# Patient Record
Sex: MALE | Race: WHITE | URBAN - METROPOLITAN AREA
[De-identification: names, ages, dates, MRNs, and addresses within clinical notes are randomized per-mention and may not be internally consistent; named-entity substitution may affect disease eponyms.]

---

## 2017-04-12 RX ORDER — LORAZEPAM 2 MG/1
TABLET ORAL
Qty: 90 TAB | Refills: 1 | Status: SHIPPED | OUTPATIENT
Start: 2017-04-12 | End: 2017-07-10 | Stop reason: SDUPTHER

## 2017-07-10 ENCOUNTER — OFFICE VISIT (OUTPATIENT)
Dept: INTERNAL MEDICINE CLINIC | Age: 42
End: 2017-07-10

## 2017-07-10 VITALS
RESPIRATION RATE: 12 BRPM | HEIGHT: 73 IN | BODY MASS INDEX: 29.42 KG/M2 | SYSTOLIC BLOOD PRESSURE: 131 MMHG | WEIGHT: 222 LBS | DIASTOLIC BLOOD PRESSURE: 83 MMHG | TEMPERATURE: 98.1 F | HEART RATE: 67 BPM

## 2017-07-10 DIAGNOSIS — F41.8 DEPRESSION WITH ANXIETY: ICD-10-CM

## 2017-07-10 DIAGNOSIS — G25.81 RESTLESS LEGS SYNDROME (RLS): ICD-10-CM

## 2017-07-10 DIAGNOSIS — R53.83 FATIGUE, UNSPECIFIED TYPE: Primary | ICD-10-CM

## 2017-07-10 DIAGNOSIS — E29.1 MALE HYPOGONADISM: ICD-10-CM

## 2017-07-10 DIAGNOSIS — R53.1 SUBJECTIVE WEAKNESS: ICD-10-CM

## 2017-07-10 DIAGNOSIS — F51.01 PRIMARY INSOMNIA: ICD-10-CM

## 2017-07-10 RX ORDER — ROPINIROLE 1 MG/1
TABLET, FILM COATED ORAL
Qty: 30 TAB | Refills: 1 | Status: SHIPPED | OUTPATIENT
Start: 2017-07-10 | End: 2017-10-10

## 2017-07-10 RX ORDER — LORAZEPAM 2 MG/1
2 TABLET ORAL
Qty: 90 TAB | Refills: 0
Start: 2017-07-10 | End: 2017-08-31 | Stop reason: SDUPTHER

## 2017-07-10 NOTE — MR AVS SNAPSHOT
Visit Information Date & Time Provider Department Dept. Phone Encounter #  
 7/10/2017  1:45 PM Renetta Feng MD [facility] [address]  [id number] Your Appointments 9/29/2017  8:30 AM  
PHYSICAL with Girtha Severe, MD  
[facility] [address]) Appt Note: fasting CPE/ Feeling weak and tired [facility] [address] [facility] [address]  
[address] [address] Upcoming Health Maintenance Date Due Pneumococcal 19-64 Medium Risk (1 of 1 - PPSV23) 11/4/1992 DTaP/Tdap/Td series (1 - Tdap) 11/4/1994 Allergies as of 7/10/2017  Review Complete On: 7/10/2017 By: Anand Garcia Severity Noted Reaction Type Reactions Bactrim [Sulfamethoprim Ds]  02/18/2011    Other (comments) Current Immunizations  Reviewed on 7/10/2017 Name Date Influenza Vaccine 11/26/2013, 1/16/2013 Influenza Vaccine (Quad) 11/24/2015  2:21 PM  
 Influenza Vaccine (Quad) PF 11/16/2016 Influenza Vaccine Split 11/29/2011 Td 10/5/2010 Reviewed by Anand Garcia on 7/10/2017 at  1:50 PM  
You Were Diagnosed With   
  
 Codes Comments Fatigue, unspecified type    -  Primary ICD-10-CM: R53.83 ICD-9-CM: 780.79 Subjective weakness     ICD-10-CM: R53.1 ICD-9-CM: 780.79 Restless legs syndrome (RLS)     ICD-10-CM: G25.81 ICD-9-CM: 333.94 Male hypogonadism     ICD-10-CM: E29.1 ICD-9-CM: 257.2 Vitals BP Pulse Temp Resp Height(growth percentile) Weight(growth percentile) 131/83 (BP 1 Location: Left arm, BP Patient Position: Sitting) 67 98.1 °F (36.7 °C) (Oral) 12 6' 1\" (1.854 m) 222 lb (100.7 kg) BMI Smoking Status 29.29 kg/m2 Never Smoker Vitals History BMI and BSA Data Body Mass Index Body Surface Area  
 29.29 kg/m 2 2.28 m 2 Preferred Pharmacy Pharmacy Name Phone [facility] [address] [place] [address] Drive, [address] [facility] [address] [phone] Your Updated Medication List  
  
   
This list is accurate as of: 7/10/17  2:43 PM.  Always use your most recent med list.  
  
  
  
  
 clomiPHENE 50 mg tablet Commonly known as:  CLOMID Three times weekly. LORazepam 2 mg tablet Commonly known as:  ATIVAN  
TAKE ONE TABLET BY MOUTH EVERY 4 HOURS AS NEEDED FOR ANXIETY  
  
 rOPINIRole 1 mg tablet Commonly known as:  Mariza Anis Take 1-2 pill nightly as needed For restless legs  
  
 tadalafil 20 mg tablet Commonly known as:  CIALIS Take 1 Tab by mouth as needed. Prescriptions Printed Refills  
 rOPINIRole (REQUIP) 1 mg tablet 1 Sig: Take 1-2 pill nightly as needed For restless legs Class: Print We Performed the Following CBC W/O DIFF [53405 CPT(R)] CK [id number] CPT(R)] FERRITIN [35624 CPT(R)] [facility] AND LH [37157 CPT(R)] IRON PROFILE [id number] CPT(R)] METABOLIC PANEL, COMPREHENSIVE [72505 CPT(R)] TESTOSTERONE, FREE+TOTAL [[id number] Custom] TSH 3RD GENERATION [82242 CPT(R)] Introducing [facility] & HE[facility]! Anusha Loza introduces [employer] patient portal. Now you can access parts of your medical record, email your doctor's office, and request medication refills online. 1. In your internet browser, go to [email or web address] 2. Click on the First Time User? Click Here link in the Sign In box. You will see the New Member Sign Up page. 3. Enter your [employer] Access Code exactly as it appears below. You will not need to use this code after youve completed the sign-up process. If you do not sign up before the expiration date, you must request a new code. · [employer] Access Code: [email or web address] Expires: 10/8/2017  1:50 PM 
 
4. Enter the last four digits of your Social Security Number (xxxx) and Date of Birth (mm/dd/yyyy) as indicated and click Submit.  You will be taken to the next sign-up page. 5. Create a Cloud[employer] ID. This will be your [employer] login ID and cannot be changed, so think of one that is secure and easy to remember. 6. Create a [employer] password. You can change your password at any time. 7. Enter your Password Reset Question and Answer. This can be used at a later time if you forget your password. 8. Enter your e-mail address. You will receive e-mail notification when new information is available in [address] 9. Click Sign Up. You can now view and download portions of your medical record. 10. Click the Download Summary menu link to download a portable copy of your medical information. If you have questions, please visit the Frequently Asked Questions section of the [employer] website. Remember, [employer] is NOT to be used for urgent needs. For medical emergencies, dial 911. Now available from your iPhone and Android! Please provide this summary of care documentation to your next provider. Your primary care clinician is listed as Anitha Mansfield. If you have any questions after today's visit, please call [phone].

## 2017-07-10 NOTE — PROGRESS NOTES
Presents to establish care. Finding things heavier to lift for about a couple of weeks. Has RLS. History of low testosterone.

## 2017-07-12 NOTE — PROGRESS NOTES
HISTORY OF PRESENT ILLNESS    New patient to my practice, referred to me by his wife who sees another provider here. Prior medical care has been from Dr Chuy Heath. he works as a [employer]. his  past medical history was reviewed, discussed, and summarized in the list below. Patient was quite irritable due to him being seen late today. He expects his appointment time would be a true time to see the doctor. Discussed that I try my best, but I tend to run 30 min late, especially later in the 1/2 day. He said \"I appreciate the straight answer\". He was being treated by Dr. Chuy Heath for hypogonadism with clomid tiw. He ran out 3 weeks ago. Was not sure it helped. No labs done on clomid. Labs 11/20176 prior to tx showed T 263, but free T 22.7 (a little high). Hx of RLS, insomnia. Hx of anxiety and depression. He consulted Dr. Felix Mcgowan for RLS. Failed trial of mirapex. Looks like he took requip  1mg tid and it may have helped per chart, but patient is not sure. He is here since he does not want to take something three times daily and seems angry that he was given tid medication    He is prescribed lorazepam 2 mg every 4 hours prn. Last refill 7/1/17 #90 and filled #90 4/12/17. He says he takes it at night or he can not sleep. Appears to take 1-2 per day at most per records.     Took wellbutrin and zoloft  The past    Feels a little more arm fatigue over the past few weeks, but it is a little better today    Review of Systems   All other systems reviewed and are negative, except as noted in HPI      Past Medical and Surgical History  Past Medical History:   Diagnosis Date    Allergic rhinitis     Alopecia, male pattern     Asthma     mild intermittent    Depression     Eczema     GERD (gastroesophageal reflux disease)     HSV-2 (herpes simplex virus 2) infection     Insomnia     Male hypogonadism     took clomid 2016    RLS (restless legs syndrome)     saw Dr. Vicky Izquierdo 8/19/15. failed mirapex        has a past surgical history that includes colonoscopy (07/08/2013). Current Outpatient Prescriptions   Medication Sig    rOPINIRole (REQUIP) 1 mg tablet Take 1-2 pill nightly as needed For restless legs    LORazepam (ATIVAN) 2 mg tablet Take 1 Tab by mouth nightly as needed for Anxiety. Max Daily Amount: 2 mg.  clomiPHENE (CLOMID) 50 mg tablet Three times weekly.  tadalafil (CIALIS) 20 mg tablet Take 1 Tab by mouth as needed. No current facility-administered medications for this visit. reports that he has never smoked. He has never used smokeless tobacco. He reports that he drinks alcohol.    family history is not on file. He was adopted. Physical Exam   Nursing note and vitals reviewed. Blood pressure 131/83, pulse 67, temperature 98.1 °F (36.7 °C), temperature source Oral, resp. rate 12, height 6' 1\" (1.854 m), weight 222 lb (100.7 kg). Constitutional: oriented to person, place, and time. No distress. Eyes: Conjunctivae are normal.   HEENT:  No cervical lymphadenopathy. No thyroid nodules or goiter  Cardiovascular: Normal rate. Regular rhythm, no murmurs, rubs. No edema  Pulmonary/Chest: Effort normal. clear to auscultation  Abdominal: soft, non-tender, non-distended  Musculoskeletal:     Neurological: Alert and oriented to person, place. Cranial nerves grossly intact. Normal gait   Skin: No rash noted. Psychiatric: appears anxious, irritable    ASSESSMENT and Rosalba Miss was seen today for establish care. Diagnoses and all orders for this visit:    Fatigue, unspecified type- may be due to low T since not treated. Check labs  -     METABOLIC PANEL, COMPREHENSIVE  -     FERRITIN  -     IRON PROFILE  -     TSH 3RD GENERATION  -     CK  -     rOPINIRole (REQUIP) 1 mg tablet; Take 1-2 pill nightly as needed For restless legs  -     CBC W/O DIFF    Subjective weakness? Check labs. mild    Restless legs syndrome (RLS)-  Relatively severe. Will try requip only at night and check iron levels. Consider trial of Horizant which would maybe work better once per day. Ativan may be helping, but I would like to wean back. Male hypogonadism - off clomid for over 1 mo. May consider resuming or may consult endo. Irritability is a concern  -     TESTOSTERONE, FREE+TOTAL  -     FSH AND LH    Primary insomnia- has been prescribed ativan for over 2 years. tx RLS. I would like to wean back in the near future. Consider doxepin. Consider bipolar?  -     LORazepam (ATIVAN) 2 mg tablet; Take 1 Tab by mouth nightly as needed for Anxiety. Max Daily Amount: 2 mg. Depression with anxiety  -     LORazepam (ATIVAN) 2 mg tablet; Take 1 Tab by mouth nightly as needed for Anxiety. Max Daily Amount: 2 mg.         There are no Patient Instructions on file for this visit.    lab results and schedule of future lab studies reviewed with patient  reviewed medications and side effects in detail    Follow-up Disposition: Not on File

## 2017-07-18 LAB
ALBUMIN SERPL-MCNC: 4.2 G/DL (ref 3.5–5.5)
ALBUMIN/GLOB SERPL: 1.7 {RATIO} (ref 1.2–2.2)
ALP SERPL-CCNC: 72 IU/L (ref 39–117)
ALT SERPL-CCNC: 17 IU/L (ref 0–44)
AST SERPL-CCNC: 15 IU/L (ref 0–40)
BILIRUB SERPL-MCNC: <0.2 MG/DL (ref 0–1.2)
BUN SERPL-MCNC: 17 MG/DL (ref 6–24)
BUN/CREAT SERPL: 18 (ref 9–20)
CALCIUM SERPL-MCNC: 9.2 MG/DL (ref 8.7–10.2)
CHLORIDE SERPL-SCNC: 103 MMOL/L (ref 96–106)
CK SERPL-CCNC: 126 U/L (ref 24–204)
CO2 SERPL-SCNC: 22 MMOL/L (ref 18–29)
CREAT SERPL-MCNC: 0.94 MG/DL (ref 0.76–1.27)
ERYTHROCYTE [DISTWIDTH] IN BLOOD BY AUTOMATED COUNT: 14.3 % (ref 12.3–15.4)
FERRITIN SERPL-MCNC: 123 NG/ML (ref 30–400)
FSH SERPL-ACNC: 2.7 MIU/ML (ref 1.5–12.4)
GLOBULIN SER CALC-MCNC: 2.5 G/DL (ref 1.5–4.5)
GLUCOSE SERPL-MCNC: 108 MG/DL (ref 65–99)
HCT VFR BLD AUTO: 42.8 % (ref 37.5–51)
HGB BLD-MCNC: 14.3 G/DL (ref 12.6–17.7)
IRON SATN MFR SERPL: 21 % (ref 15–55)
IRON SERPL-MCNC: 73 UG/DL (ref 38–169)
LH SERPL-ACNC: 2.4 MIU/ML (ref 1.7–8.6)
MCH RBC QN AUTO: 28.5 PG (ref 26.6–33)
MCHC RBC AUTO-ENTMCNC: 33.4 G/DL (ref 31.5–35.7)
MCV RBC AUTO: 85 FL (ref 79–97)
PLATELET # BLD AUTO: 299 X10E3/UL (ref 150–379)
POTASSIUM SERPL-SCNC: 4.7 MMOL/L (ref 3.5–5.2)
PROT SERPL-MCNC: 6.7 G/DL (ref 6–8.5)
RBC # BLD AUTO: 5.01 X10E6/UL (ref 4.14–5.8)
SODIUM SERPL-SCNC: 141 MMOL/L (ref 134–144)
TESTOST FREE SERPL-MCNC: 18.9 PG/ML (ref 6.8–21.5)
TESTOST SERPL-MCNC: 349.9 NG/DL (ref 348–1197)
TIBC SERPL-MCNC: 344 UG/DL (ref 250–450)
TSH SERPL DL<=0.005 MIU/L-ACNC: 2.36 UIU/ML (ref 0.45–4.5)
UIBC SERPL-MCNC: 271 UG/DL (ref 111–343)
WBC # BLD AUTO: 6.6 X10E3/UL (ref 3.4–10.8)

## 2017-08-31 ENCOUNTER — OFFICE VISIT (OUTPATIENT)
Dept: INTERNAL MEDICINE CLINIC | Age: 42
End: 2017-08-31

## 2017-08-31 VITALS
DIASTOLIC BLOOD PRESSURE: 94 MMHG | HEIGHT: 73 IN | SYSTOLIC BLOOD PRESSURE: 129 MMHG | RESPIRATION RATE: 18 BRPM | HEART RATE: 78 BPM | BODY MASS INDEX: 29.21 KG/M2 | OXYGEN SATURATION: 97 % | WEIGHT: 220.4 LBS | TEMPERATURE: 97.8 F

## 2017-08-31 DIAGNOSIS — G25.81 RESTLESS LEGS SYNDROME (RLS): ICD-10-CM

## 2017-08-31 DIAGNOSIS — E29.1 MALE HYPOGONADISM: ICD-10-CM

## 2017-08-31 DIAGNOSIS — F41.8 DEPRESSION WITH ANXIETY: Primary | ICD-10-CM

## 2017-08-31 DIAGNOSIS — F51.01 PRIMARY INSOMNIA: ICD-10-CM

## 2017-08-31 RX ORDER — LORAZEPAM 2 MG/1
2 TABLET ORAL
Qty: 90 TAB | Refills: 1 | Status: SHIPPED | OUTPATIENT
Start: 2017-08-31 | End: 2018-03-03 | Stop reason: SDUPTHER

## 2017-08-31 RX ORDER — BUPROPION HYDROCHLORIDE 300 MG/1
300 TABLET ORAL
Qty: 90 TAB | Refills: 1 | Status: SHIPPED | OUTPATIENT
Start: 2017-08-31 | End: 2017-11-02

## 2017-08-31 RX ORDER — DICLOFENAC SODIUM 75 MG/1
75 TABLET, DELAYED RELEASE ORAL
COMMUNITY
Start: 2017-08-28 | End: 2017-10-10

## 2017-08-31 RX ORDER — CLOMIPHENE CITRATE 50 MG/1
TABLET ORAL
Qty: 40 TAB | Refills: 1 | Status: SHIPPED | OUTPATIENT
Start: 2017-08-31 | End: 2017-11-02

## 2017-08-31 NOTE — PROGRESS NOTES
HISTORY OF PRESENT ILLNESS  Ho Diaz is a 35 y.o. male. HPI  Here for depression. This is chronic. He did well with zoloft but got sexual side effects. He wants to try something. He should be happy as he has a good job, marriage and a new baby. He has male hypogonadism and needs a refill of clomid . He has had recent labs with Dr Lindy Ryan. His testosterone improved. He has RLS. He did not respond to requip or mirapex. He has seen neurology and had a sleep study. He sleeps fine with ativan and needs a refill. Past Medical History:   Diagnosis Date    Allergic rhinitis     Alopecia, male pattern     Asthma     mild intermittent    Depression with anxiety     took wellbutrin, zoloft. anger management issues    Eczema     GERD (gastroesophageal reflux disease)     HSV-2 (herpes simplex virus 2) infection     Insomnia     Male hypogonadism     took clomid 2016    RLS (restless legs syndrome)     saw Dr. Mando Lockwood 8/19/15. failed mirapex     Current Outpatient Prescriptions   Medication Sig    diclofenac EC (VOLTAREN) 75 mg EC tablet Take 75 mg by mouth.  LORazepam (ATIVAN) 2 mg tablet Take 1 Tab by mouth nightly as needed for Anxiety. Max Daily Amount: 2 mg.  clomiPHENE (CLOMID) 50 mg tablet Three times weekly.  buPROPion XL (WELLBUTRIN XL) 300 mg XL tablet Take 1 Tab by mouth every morning.  rOPINIRole (REQUIP) 1 mg tablet Take 1-2 pill nightly as needed For restless legs    tadalafil (CIALIS) 20 mg tablet Take 1 Tab by mouth as needed. No current facility-administered medications for this visit. Review of Systems   All other systems reviewed and are negative. Visit Vitals    BP (!) 129/94 (BP 1 Location: Left arm, BP Patient Position: At rest)    Pulse 78    Temp 97.8 °F (36.6 °C) (Oral)    Resp 18    Ht 6' 1\" (1.854 m)    Wt 220 lb 6.4 oz (100 kg)    SpO2 97%    BMI 29.08 kg/m2       Physical Exam   Constitutional: He appears well-developed and well-nourished. Psychiatric: He has a normal mood and affect. His behavior is normal. Judgment and thought content normal.   Nursing note and vitals reviewed. ASSESSMENT and PLAN  Diagnoses and all orders for this visit:    1. Depression with anxiety  -    Start buPROPion XL (WELLBUTRIN XL) 300 mg XL tablet; Take 1 Tab by mouth every morning. 2. Male hypogonadism  -     Restart clomiPHENE (CLOMID) 50 mg tablet; Three times weekly. 3. Restless legs syndrome (RLS)  -     REFERRAL TO NEUROLOGY    4. Primary insomnia  -     LORazepam (ATIVAN) 2 mg tablet; Take 1 Tab by mouth nightly as needed for Anxiety. Max Daily Amount: 2 mg.     Reviewed plan of care with the patient who has provided input and agrees with the goals

## 2017-08-31 NOTE — PROGRESS NOTES
1. Have you been to the ER, urgent care clinic since your last visit? Hospitalized since your last visit?no      2. Have you seen or consulted any other health care providers outside of the [address] [place] [address] since your last visit? Include any pap smears or colon screening.  No  Chief Complaint   Patient presents with    Medication Evaluation     Fasting

## 2017-08-31 NOTE — MR AVS SNAPSHOT
Visit Information Date & Time Provider Department Dept. Phone Encounter #  
 8/31/2017 10:00 AM Deena Richardson MD [facility] [phone] [id number] Follow-up Instructions Return in about 6 weeks (around 10/12/2017). Upcoming Health Maintenance Date Due Pneumococcal 19-64 Medium Risk (1 of 1 - PPSV23) 11/4/1992 DTaP/Tdap/Td series (1 - Tdap) 10/6/2010 Allergies as of 8/31/2017  Review Complete On: 8/31/2017 By: Dharmesh Cheung Severity Noted Reaction Type Reactions Bactrim [Sulfamethoprim Ds]  02/18/2011    Other (comments) Current Immunizations  Reviewed on 7/10/2017 Name Date Influenza Vaccine 11/26/2013, 1/16/2013 Influenza Vaccine (Quad) 11/24/2015  2:21 PM  
 Influenza Vaccine (Quad) PF 11/16/2016 Influenza Vaccine Split 11/29/2011 Td 10/5/2010 Not reviewed this visit You Were Diagnosed With   
  
 Codes Comments Depression with anxiety    -  Primary ICD-10-CM: F41.8 ICD-9-CM: 300.4 Male hypogonadism     ICD-10-CM: E29.1 ICD-9-CM: 257.2 Restless legs syndrome (RLS)     ICD-10-CM: G25.81 ICD-9-CM: 333.94 Primary insomnia     ICD-10-CM: F51.01 
ICD-9-CM: 307.42 Vitals BP Pulse Temp Resp Height(growth percentile) Weight(growth percentile) (!) 129/94 (BP 1 Location: Left arm, BP Patient Position: At rest) 78 97.8 °F (36.6 °C) (Oral) 18 6' 1\" (1.854 m) 220 lb 6.4 oz (100 kg) SpO2 BMI Smoking Status 97% 29.08 kg/m2 Never Smoker Vitals History BMI and BSA Data Body Mass Index Body Surface Area 29.08 kg/m 2 2.27 m 2 Preferred Pharmacy Pharmacy Name Phone [facility] [address], [address] [place] [address] Rd. [address] [phone] Your Updated Medication List  
  
   
This list is accurate as of: 8/31/17 10:46 AM.  Always use your most recent med list.  
  
  
  
  
 buPROPion  mg XL tablet Commonly known as:  Vernestine Salk Take 1 Tab by mouth every morning. clomiPHENE 50 mg tablet Commonly known as:  CLOMID Three times weekly. diclofenac EC 75 mg EC tablet Commonly known as:  VOLTAREN Take 75 mg by mouth. LORazepam 2 mg tablet Commonly known as:  ATIVAN Take 1 Tab by mouth nightly as needed for Anxiety. Max Daily Amount: 2 mg. rOPINIRole 1 mg tablet Commonly known as:  Jignesh Parlor Take 1-2 pill nightly as needed For restless legs  
  
 tadalafil 20 mg tablet Commonly known as:  CIALIS Take 1 Tab by mouth as needed. Prescriptions Printed Refills LORazepam (ATIVAN) 2 mg tablet 1 Sig: Take 1 Tab by mouth nightly as needed for Anxiety. Max Daily Amount: 2 mg. Class: Print Route: Oral  
  
Prescriptions Sent to Pharmacy Refills  
 clomiPHENE (CLOMID) 50 mg tablet 1 Sig: Three times weekly. Class: Normal  
 Pharmacy: [facility] [address] [facility] Ph #: [phone]  
 buPROPion XL (WELLBUTRIN XL) 300 mg XL tablet 1 Sig: Take 1 Tab by mouth every morning. Class: Normal  
 Pharmacy: [facility] [address], [address] [facility] Ph #: [phone] Route: Oral  
  
We Performed the Following REFERRAL TO NEUROLOGY [PGO66 Custom] Comments:  
 Please evaluate patient for RLS. Follow-up Instructions Return in about 6 weeks (around 10/12/2017). Referral Information Referral ID Referred By [facility]  
  
 [id number]   
   [address] [facility] [address] [place] [address] Phone: [phone] Fax: [phone] Visits Status Start Date End Date 1 New Request 8/31/17 8/31/18 If your referral has a status of pending review or denied, additional information will be sent to support the outcome of this decision. Introducing [facility]! Prudence Farrell introduces [employer] patient portal. Now you can access parts of your medical record, email your doctor's office, and request medication refills online. 1. In your internet browser, go to [email or web address] 2. Click on the First Time User? Click Here link in the Sign In box. You will see the New Member Sign Up page. 3. Enter your [employer] Access Code exactly as it appears below. You will not need to use this code after youve completed the sign-up process. If you do not sign up before the expiration date, you must request a new code. · [employer] Access Code: [email or web address] Expires: 10/8/2017  1:50 PM 
 
4. Enter the last four digits of your Social Security Number (xxxx) and Date of Birth (mm/dd/yyyy) as indicated and click Submit. You will be taken to the next sign-up page. 5. Create a [employer] ID. This will be your [employer] login ID and cannot be changed, so think of one that is secure and easy to remember. 6. Create a [employer] password. You can change your password at any time. 7. Enter your Password Reset Question and Answer. This can be used at a later time if you forget your password. 8. Enter your e-mail address. You will receive e-mail notification when new information is available in [address] 9. Click Sign Up. You can now view and download portions of your medical record. 10. Click the Download Summary menu link to download a portable copy of your medical information. If you have questions, please visit the Frequently Asked Questions section of the [employer] website. Remember, [employer] is NOT to be used for urgent needs. For medical emergencies, dial 911. Now available from your iPhone and Android! Please provide this summary of care documentation to your next provider. Your primary care clinician is listed as Prince Scott. If you have any questions after today's visit, please call [phone].

## 2017-09-26 ENCOUNTER — TELEPHONE (OUTPATIENT)
Dept: INTERNAL MEDICINE CLINIC | Age: 42
End: 2017-09-26

## 2017-09-26 RX ORDER — PREDNISONE 20 MG/1
20 TABLET ORAL
Qty: 20 TAB | Refills: 0 | Status: SHIPPED | OUTPATIENT
Start: 2017-09-26 | End: 2017-10-10

## 2017-09-26 RX ORDER — AZITHROMYCIN 250 MG/1
250 TABLET, FILM COATED ORAL SEE ADMIN INSTRUCTIONS
Qty: 6 TAB | Refills: 0 | Status: SHIPPED | OUTPATIENT
Start: 2017-09-26 | End: 2017-10-01

## 2017-10-10 ENCOUNTER — OFFICE VISIT (OUTPATIENT)
Dept: INTERNAL MEDICINE CLINIC | Age: 42
End: 2017-10-10

## 2017-10-10 VITALS
BODY MASS INDEX: 29.24 KG/M2 | OXYGEN SATURATION: 97 % | WEIGHT: 220.6 LBS | HEART RATE: 80 BPM | RESPIRATION RATE: 16 BRPM | SYSTOLIC BLOOD PRESSURE: 140 MMHG | TEMPERATURE: 98.2 F | DIASTOLIC BLOOD PRESSURE: 87 MMHG | HEIGHT: 73 IN

## 2017-10-10 DIAGNOSIS — Z23 ENCOUNTER FOR IMMUNIZATION: ICD-10-CM

## 2017-10-10 DIAGNOSIS — F43.9 SITUATIONAL STRESS: ICD-10-CM

## 2017-10-10 DIAGNOSIS — M79.673 PAIN OF FOOT, UNSPECIFIED LATERALITY: ICD-10-CM

## 2017-10-10 DIAGNOSIS — F32.9 REACTIVE DEPRESSION: Primary | ICD-10-CM

## 2017-10-10 RX ORDER — DICLOFENAC SODIUM 75 MG/1
75 TABLET, DELAYED RELEASE ORAL 2 TIMES DAILY
Qty: 180 TAB | Refills: 1 | Status: SHIPPED | OUTPATIENT
Start: 2017-10-10 | End: 2017-11-02
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
## 2017-10-10 NOTE — PROGRESS NOTES
1. Have you been to the ER, urgent care clinic since your last visit? Hospitalized since your last visit? No    2. Have you seen or consulted any other health care providers outside of the [address] [place] [address] since your last visit? Include any pap smears or colon screening.  No   Chief Complaint   Patient presents with    Medication Evaluation     6 weeks follow up     Fasting

## 2017-10-10 NOTE — MR AVS SNAPSHOT
Visit Information Date & Time Provider Department Dept. Phone Encounter #  
 10/10/2017 10:00 AM Crissy Alexander MD [facility] [phone] [id number] Follow-up Instructions Return in about 3 months (around 1/10/2018). Follow-up and Disposition History Upcoming Health Maintenance Date Due Pneumococcal 19-64 Medium Risk (1 of 1 - PPSV23) 11/4/1992 DTaP/Tdap/Td series (1 - Tdap) 10/6/2010 Allergies as of 10/10/2017  Review Complete On: 10/10/2017 By: Kishore Bishop Severity Noted Reaction Type Reactions Bactrim [Sulfamethoprim Ds]  02/18/2011    Other (comments) Current Immunizations  Reviewed on 7/10/2017 Name Date Influenza Vaccine 11/26/2013, 1/16/2013 Influenza Vaccine (Quad) 11/24/2015  2:21 PM  
 Influenza Vaccine (Quad) PF 11/16/2016 Influenza Vaccine Split 11/29/2011 Td 10/5/2010 Not reviewed this visit You Were Diagnosed With   
  
 Codes Comments Reactive depression    -  Primary ICD-10-CM: F32.9 ICD-9-CM: 300.4 Situational stress     ICD-10-CM: F43.9 ICD-9-CM: V62.89 Pain of foot, unspecified laterality     ICD-10-CM: Y52.980 ICD-9-CM: 729.5 Encounter for immunization     ICD-10-CM: C57 ICD-9-CM: V03.89 Vitals BP Pulse Temp Resp Height(growth percentile) Weight(growth percentile) 140/87 (BP 1 Location: Left arm, BP Patient Position: At rest) 80 98.2 °F (36.8 °C) (Oral) 16 6' 1\" (1.854 m) 220 lb 9.6 oz (100.1 kg) SpO2 BMI Smoking Status 97% 29.1 kg/m2 Never Smoker BMI and BSA Data Body Mass Index Body Surface Area  
 29.1 kg/m 2 2.27 m 2 Preferred Pharmacy Pharmacy Name Phone [facility] [address], [address] [place] [address] [address]. [address] [phone] Your Updated Medication List  
  
   
This list is accurate as of: 10/10/17 10:47 AM.  Always use your most recent med list.  
  
  
  
  
 buPROPion  mg XL tablet Commonly known as:  Ismael Christianne Take 1 Tab by mouth every morning. clomiPHENE 50 mg tablet Commonly known as:  CLOMID Three times weekly. diclofenac EC 75 mg EC tablet Commonly known as:  VOLTAREN Take 1 Tab by mouth two (2) times a day. LORazepam 2 mg tablet Commonly known as:  ATIVAN Take 1 Tab by mouth nightly as needed for Anxiety. Max Daily Amount: 2 mg. Prescriptions Sent to Pharmacy Refills  
 diclofenac EC (VOLTAREN) 75 mg EC tablet 1 Sig: Take 1 Tab by mouth two (2) times a day. Class: Normal  
 Pharmacy: [address], [address] [facility] #: [phone] Route: Oral  
  
Follow-up Instructions Return in about 3 months (around 1/10/2018). Introducing [facility] SERVICES! Kimberly Beavers introduces [employer] patient portal. Now you can access parts of your medical record, email your doctor's office, and request medication refills online. 1. In your internet browser, go to [email or web address] 2. Click on the First Time User? Click Here link in the Sign In box. You will see the New Member Sign Up page. 3. Enter your [employer] Access Code exactly as it appears below. You will not need to use this code after youve completed the sign-up process. If you do not sign up before the expiration date, you must request a new code. · [employer] Access Code: [email or web address] Expires: 1/8/2018 10:47 AM 
 
4. Enter the last four digits of your Social Security Number (xxxx) and Date of Birth (mm/dd/yyyy) as indicated and click Submit. You will be taken to the next sign-up page. 5. Create a mGeneratort ID. This will be your [employer] login ID and cannot be changed, so think of one that is secure and easy to remember. 6. Create a [employer] password. You can change your password at any time. 7. Enter your Password Reset Question and Answer.  This can be used at a later time if you forget your password. 8. Enter your e-mail address. You will receive e-mail notification when new information is available in [address] 9. Click Sign Up. You can now view and download portions of your medical record. 10. Click the Download Summary menu link to download a portable copy of your medical information. If you have questions, please visit the Frequently Asked Questions section of the [employer] website. Remember, [employer] is NOT to be used for urgent needs. For medical emergencies, dial 911. Now available from your iPhone and Android! Please provide this summary of care documentation to your next provider. Your primary care clinician is listed as [address] [place] [address] [address]. If you have any questions after today's visit, please call [phone].

## 2017-10-24 ENCOUNTER — TELEPHONE (OUTPATIENT)
Dept: INTERNAL MEDICINE CLINIC | Age: 42
End: 2017-10-24

## 2017-10-24 RX ORDER — SERTRALINE HYDROCHLORIDE 50 MG/1
50 TABLET, FILM COATED ORAL DAILY
Qty: 90 TAB | Refills: 1 | Status: SHIPPED | OUTPATIENT
Start: 2017-10-24 | End: 2018-03-30 | Stop reason: SDUPTHER

## 2017-10-24 NOTE — TELEPHONE ENCOUNTER
Patient was prescribed Wellbutrin but he states that he feels its is not working. He would like to go back on Zoloft because that worked well for him.  Please call and advise     [phone]

## 2017-11-02 ENCOUNTER — HOSPITAL ENCOUNTER (EMERGENCY)
Age: 42
Discharge: HOME | End: 2017-11-02
Attending: EMERGENCY MEDICINE
Payer: COMMERCIAL

## 2017-11-02 VITALS
HEIGHT: 73 IN | BODY MASS INDEX: 29.16 KG/M2 | HEART RATE: 76 BPM | TEMPERATURE: 97.7 F | WEIGHT: 220 LBS | DIASTOLIC BLOOD PRESSURE: 87 MMHG | OXYGEN SATURATION: 98 % | SYSTOLIC BLOOD PRESSURE: 133 MMHG | RESPIRATION RATE: 16 BRPM

## 2017-11-02 DIAGNOSIS — S81.811A LACERATION OF RIGHT LOWER EXTREMITY, INITIAL ENCOUNTER: Primary | ICD-10-CM

## 2017-11-02 PROCEDURE — 90471 IMMUNIZATION ADMIN: CPT

## 2017-11-02 PROCEDURE — 74011250636 HC RX REV CODE- 250/636: Performed by: PHYSICIAN ASSISTANT

## 2017-11-02 PROCEDURE — 99283 EMERGENCY DEPT VISIT LOW MDM: CPT

## 2017-11-02 PROCEDURE — 90715 TDAP VACCINE 7 YRS/> IM: CPT | Performed by: PHYSICIAN ASSISTANT

## 2017-11-02 PROCEDURE — 74011000250 HC RX REV CODE- 250: Performed by: PHYSICIAN ASSISTANT

## 2017-11-02 PROCEDURE — 77030018836 HC SOL IRR NACL ICUM -A

## 2017-11-02 PROCEDURE — 74011250637 HC RX REV CODE- 250/637: Performed by: PHYSICIAN ASSISTANT

## 2017-11-02 PROCEDURE — 77030002916 HC SUT ETHLN J&J -A

## 2017-11-02 PROCEDURE — 75810000293 HC SIMP/SUPERF WND  RPR

## 2017-11-02 RX ORDER — OXYCODONE AND ACETAMINOPHEN 5; 325 MG/1; MG/1
1 TABLET ORAL
Status: COMPLETED | OUTPATIENT
Start: 2017-11-02 | End: 2017-11-02

## 2017-11-02 RX ORDER — OXYCODONE AND ACETAMINOPHEN 5; 325 MG/1; MG/1
1 TABLET ORAL
Qty: 10 TAB | Refills: 0 | Status: SHIPPED | OUTPATIENT
Start: 2017-11-02 | End: 2018-02-01 | Stop reason: ALTCHOICE

## 2017-11-02 RX ORDER — LIDOCAINE HYDROCHLORIDE AND EPINEPHRINE 10; 10 MG/ML; UG/ML
1.5 INJECTION, SOLUTION INFILTRATION; PERINEURAL
Status: COMPLETED | OUTPATIENT
Start: 2017-11-02 | End: 2017-11-02

## 2017-11-02 RX ADMIN — TETANUS TOXOID, REDUCED DIPHTHERIA TOXOID AND ACELLULAR PERTUSSIS VACCINE, ADSORBED 0.5 ML: 5; 2.5; 8; 8; 2.5 SUSPENSION INTRAMUSCULAR at 17:34

## 2017-11-02 RX ADMIN — OXYCODONE HYDROCHLORIDE AND ACETAMINOPHEN 1 TABLET: 5; 325 TABLET ORAL at 17:06

## 2017-11-02 RX ADMIN — LIDOCAINE HYDROCHLORIDE,EPINEPHRINE BITARTRATE 15 MG: 10; .01 INJECTION, SOLUTION INFILTRATION; PERINEURAL at 17:06

## 2017-11-02 RX ADMIN — BACITRACIN ZINC, NEOMYCIN SULFATE, POLYMYXIN B SULFATE 1 PACKET: 3.5; 5000; 4 OINTMENT TOPICAL at 17:07

## 2017-11-02 NOTE — ED PROVIDER NOTES
HPI Comments: Dina Burnham is a 35 y.o. male who presents ambulatory to ER with c/o laceration to right lower leg x PTA. Pt states he was taking the trash out when he got cut by a broken piece of glass in the trash bag on right lower leg. Bleeding not controlled. Td not UTD. No other complaints. PCP: Star Cedeño MD   PMHx significant for: Past Medical History:  No date: Allergic rhinitis  No date: Alopecia, male pattern  No date: Asthma      Comment: mild intermittent  No date: Depression with anxiety      Comment: took wellbutrin, zoloft. anger management                issues  No date: Eczema  No date: GERD (gastroesophageal reflux disease)  No date: HSV-2 (herpes simplex virus 2) infection  No date: Insomnia  No date: Male hypogonadism      Comment: took clomid 2016  No date: RLS (restless legs syndrome)      Comment: saw Dr. Sheryl Blanco 8/19/15. failed mirapex    PSHx significant for: Past Surgical History:  07/08/2013: HX COLONOSCOPY      Comment: Dr. Anisa Shepherd. normal      -- Bactrim (Sulfamethoprim Ds) -- Other (comments)    There are no other complaints, changes or physical findings at this time. The history is provided by the patient. Past Medical History:   Diagnosis Date    Allergic rhinitis     Alopecia, male pattern     Asthma     mild intermittent    Depression with anxiety     took wellbutrin, zoloft. anger management issues    Eczema     GERD (gastroesophageal reflux disease)     HSV-2 (herpes simplex virus 2) infection     Insomnia     Male hypogonadism     took clomid 2016    RLS (restless legs syndrome)     saw Dr. Sheryl Blanco 8/19/15. failed mirapex       Past Surgical History:   Procedure Laterality Date    HX COLONOSCOPY  07/08/2013    Dr. Anisa Shepherd.   normal         Family History:   Problem Relation Age of Onset    Adopted: Yes       Social History     Social History    Marital status: SINGLE     Spouse name: Rhea Patricio    Number of children: 1    Years of education: N/A     Occupational History    Not on file. Social History Main Topics    Smoking status: Never Smoker    Smokeless tobacco: Never Used    Alcohol use Yes      Comment: occasionally    Drug use: Not on file    Sexual activity: Yes     Partners: Female     Other Topics Concern    Not on file     Social History Narrative         ALLERGIES: Bactrim [sulfamethoprim ds]    Review of Systems   Constitutional: Negative. HENT: Negative. Eyes: Negative. Respiratory: Negative. Cardiovascular: Negative. Gastrointestinal: Negative. Genitourinary: Negative. Musculoskeletal: Negative. Skin: Positive for wound (R laceration). Neurological: Negative. Hematological: Negative. Psychiatric/Behavioral: Negative. All other systems reviewed and are negative. Vitals:    11/02/17 1703 11/02/17 1744   BP: 131/75 133/87   Pulse: 99 76   Resp: 18 16   Temp: 97.7 °F (36.5 °C)    SpO2: 96% 98%   Weight: 99.8 kg (220 lb)    Height: 6' 1\" (1.854 m)             Physical Exam   Constitutional: He is oriented to person, place, and time. He appears well-developed and well-nourished. No distress. HENT:   Head: Normocephalic and atraumatic. Neck: Normal range of motion. Cardiovascular: Intact distal pulses and normal pulses. Musculoskeletal: Normal range of motion. He exhibits no edema or tenderness. Neurological: He is alert and oriented to person, place, and time. He has normal strength. No sensory deficit. Skin: Skin is warm and dry. No rash noted. He is not diaphoretic. No erythema. No pallor. 2cm x 2cm V shaped laceration/flap to right lateral lower leg. No tendon involvement. Bleeding controlled. NVi distally, DP pulse 2+. Psychiatric: He has a normal mood and affect. His behavior is normal.   Nursing note and vitals reviewed.        MDM  Number of Diagnoses or Management Options  Laceration of right lower extremity, initial encounter:   Diagnosis management comments: DDx: laceration, tendon laceration, retained FB       Amount and/or Complexity of Data Reviewed  Discuss the patient with other providers: yes (Dr. Candy Hawley)    Patient Progress  Patient progress: stable    ED Course       Procedures                       4:58 PM   Lonnie Crum PA-C discussed patient with Andie Pierre MD who is in agreement with care plan as outlined. Will be in to see the patient. No further recommendations. Lonnie Crum PA-C      Procedure Note - Laceration Repair:  4:28 PM  Procedure by Lonnie Crum PA-C . Complexity: complex  2cm x 2cm v-shaped laceration to right lateral leg  was irrigated copiously with NS under jet lavage, prepped with safclens and draped in a sterile fashion. The area was anesthetized with 7.5 mLs of  Lidocaine 1% with epinephrine via local infiltration. The wound was explored with the following results: No foreign bodies found, No tendon laceration seen. The wound was repaired with One layer suture closure: Skin Layer:  7 sutures placed, stitch type:simple interrupted, suture: 4-0 nylon. .  The wound was closed with good hemostasis and approximation. Sterile dressing applied. Estimated blood loss: <5ccs  The procedure took 16-30 minutes, and pt tolerated well.    6:00 PM  Pt has been reevaluated. There are no new complaints, changes, or physical findings at this time. Medications have been reviewed w/ pt and/or family. Pt and/or family's questions have been answered. Pt and/or family expressed good understanding of the dx/tx/rx and is in agreement with plan of care. Pt instructed and agreed to f/u w/ PCP and to return to ED upon further deterioration. Pt is ready for discharge. LABORATORY TESTS:  No results found for this or any previous visit (from the past 12 hour(s)). IMAGING RESULTS:  No orders to display     No results found.       MEDICATIONS GIVEN:  Medications   lidocaine-EPINEPHrine (XYLOCAINE) 1 %-1:100,000 injection 15 mg (15 mg IntraDERMal Given by Provider 11/2/17 1706)   oxyCODONE-acetaminophen (PERCOCET) 5-325 mg per tablet 1 Tab (1 Tab Oral Given 11/2/17 1706)   neomycin-bacitracnZn-polymyxnB (NEOSPORIN) ointment 1 Packet (1 Packet Topical Given 11/2/17 1707)   diph,Pertuss(AC),Tet Vac-PF (BOOSTRIX) suspension 0.5 mL (0.5 mL IntraMUSCular Given 11/2/17 1734)       IMPRESSION:  1. Laceration of right lower extremity, initial encounter        PLAN:  1. Current Discharge Medication List      START taking these medications    Details   oxyCODONE-acetaminophen (PERCOCET) 5-325 mg per tablet Take 1 Tab by mouth every six (6) hours as needed for Pain. Max Daily Amount: 4 Tabs. Qty: 10 Tab, Refills: 0         CONTINUE these medications which have NOT CHANGED    Details   sertraline (ZOLOFT) 50 mg tablet Take 1 Tab by mouth daily. Qty: 90 Tab, Refills: 1      LORazepam (ATIVAN) 2 mg tablet Take 1 Tab by mouth nightly as needed for Anxiety. Max Daily Amount: 2 mg. Qty: 90 Tab, Refills: 1    Associated Diagnoses: Primary insomnia           2.    Follow-up Information     Follow up With Details Comments Contact Info    Charlee Valdez MD Schedule an appointment as soon as possible for a visit For suture removal 14 days [address] [address]  [phone]      [facility] EMERGENCY DEPT  [facility] [address] [place] [address]  [phone]            Return to ED if worse

## 2017-11-02 NOTE — DISCHARGE INSTRUCTIONS
Cuts: Care Instructions  Your Care Instructions  A cut can happen anywhere on your body. Stitches, staples, skin adhesives, or pieces of tape called Steri-Strips are sometimes used to keep the edges of a cut together and help it heal. Steri-Strips can be used by themselves or with stitches or staples. Sometimes cuts are left open. If the cut went deep and through the skin, the doctor may have closed the cut in two layers. A deeper layer of stitches brings the deep part of the cut together. These stitches will dissolve and don't need to be removed. The upper layer closure, which could be stitches, staples, Steri-Strips, or adhesive, is what you see on the cut. A cut is often covered by a bandage. The doctor has checked you carefully, but problems can develop later. If you notice any problems or new symptoms, get medical treatment right away. Follow-up care is a key part of your treatment and safety. Be sure to make and go to all appointments, and call your doctor if you are having problems. It's also a good idea to know your test results and keep a list of the medicines you take. How can you care for yourself at home? If a cut is open or closed  · Prop up the sore area on a pillow anytime you sit or lie down during the next 3 days. Try to keep it above the level of your heart. This will help reduce swelling. · Keep the cut dry for the first 24 to 48 hours. After this, you can shower if your doctor okays it. Pat the cut dry. · Don't soak the cut, such as in a bathtub. Your doctor will tell you when it's safe to get the cut wet. · After the first 24 to 48 hours, clean the cut with soap and water 2 times a day unless your doctor gives you different instructions. ¨ Don't use hydrogen peroxide or alcohol, which can slow healing. ¨ You may cover the cut with a thin layer of petroleum jelly and a nonstick bandage.   ¨ If the doctor put a bandage over the cut, put on a new bandage after cleaning the cut or if the bandage gets wet or dirty. · Avoid any activity that could cause your cut to reopen. · Be safe with medicines. Read and follow all instructions on the label. ¨ If the doctor gave you a prescription medicine for pain, take it as prescribed. ¨ If you are not taking a prescription pain medicine, ask your doctor if you can take an over-the-counter medicine. If the cut is closed with stitches, staples, or Steri-Strips  · Follow the above instructions for open or closed cuts. · Do not remove the stitches or staples on your own. Your doctor will tell you when to come back to have the stitches or staples removed. · Leave Steri-Strips on until they fall off. If the cut is closed with a skin adhesive  · Follow the above instructions for open or closed cuts. · Leave the skin adhesive on your skin until it falls off on its own. This may take 5 to 10 days. · Do not scratch, rub, or pick at the adhesive. · Do not put the sticky part of a bandage directly on the adhesive. · Do not put any kind of ointment, cream, or lotion over the area. This can make the adhesive fall off too soon. Do not use hydrogen peroxide or alcohol, which can slow healing. When should you call for help? Call your doctor now or seek immediate medical care if:  ? · You have new pain, or your pain gets worse. ? · The skin near the cut is cold or pale or changes color. ? · You have tingling, weakness, or numbness near the cut.   ? · The cut starts to bleed, and blood soaks through the bandage. Oozing small amounts of blood is normal.   ? · You have trouble moving the area near the cut.   ? · You have symptoms of infection, such as:  ¨ Increased pain, swelling, warmth, or redness around the cut. ¨ Red streaks leading from the cut. ¨ Pus draining from the cut. ¨ A fever. ? Watch closely for changes in your health, and be sure to contact your doctor if:  ? · The cut reopens. ? · You do not get better as expected.    Where can you learn more? Go to http://melissa-khloe.info/. Enter M735 in the search box to learn more about \"Cuts: Care Instructions. \"  Current as of: March 20, 2017  Content Version: 11.4  © 6357-6749 [employer]. Care instructions adapted under license by [employer] (which disclaims liability or warranty for this information). If you have questions about a medical condition or this instruction, always ask your healthcare professional. [address] any warranty or liability for your use of this information.

## 2017-11-02 NOTE — ED NOTES
Bacitracin and nonstick dressing applied, wrapped with ace wrap. The patient was discharged home by DANIEL Mcneil and Sheng Crenshaw RN in stable condition, accompanied by family. The patient is alert and oriented, is in no respiratory distress. The patient's diagnosis, condition and treatment were explained to patient or parent/guardian. The patient/responsible party expressed understanding. 1 prescriptions given to pt. No work/school note given to pt. A discharge plan has been developed. A  was not involved in the process. Aftercare instructions were given to the patient. Pt taken to discharge via wheelchair.

## 2017-11-02 NOTE — ED TRIAGE NOTES
Pt reports that he was taking out the trash and a piece of glass in the bag cut his right lower leg. There is a 4cm laceration noted above the right ankle and bleeding profusely on arrival.  Direct pressure applied on arrival to control bleeding.

## 2017-11-06 ENCOUNTER — OFFICE VISIT (OUTPATIENT)
Dept: INTERNAL MEDICINE CLINIC | Age: 42
End: 2017-11-06

## 2017-11-06 VITALS
SYSTOLIC BLOOD PRESSURE: 126 MMHG | TEMPERATURE: 98.2 F | WEIGHT: 221.6 LBS | HEART RATE: 87 BPM | BODY MASS INDEX: 29.37 KG/M2 | HEIGHT: 73 IN | RESPIRATION RATE: 12 BRPM | DIASTOLIC BLOOD PRESSURE: 82 MMHG

## 2017-11-06 DIAGNOSIS — S91.011A LACERATION OF RIGHT ANKLE, INITIAL ENCOUNTER: Primary | ICD-10-CM

## 2017-11-06 RX ORDER — CEPHALEXIN 500 MG/1
500 CAPSULE ORAL 4 TIMES DAILY
Qty: 28 CAP | Refills: 0 | Status: SHIPPED | OUTPATIENT
Start: 2017-11-06 | End: 2018-02-01 | Stop reason: ALTCHOICE

## 2017-11-06 NOTE — PROGRESS NOTES
Patient states 4 days ago he had a piece of glass lacerate his RLE. Seen in ER. Stiderricked. Today he is having pain and redness at the site.

## 2017-11-06 NOTE — MR AVS SNAPSHOT
Visit Information Date & Time Provider Department Dept. Phone Encounter #  
 11/6/2017  2:00 PM Oswaldo Buodreaux MD [facility] [address] [id number] Your Appointments 1/9/2018  9:00 AM  
ROUTINE CARE with Donna Everett MD  
[facility] [address]) Appt Note: 3 MONTH F/U  
 [place] [address] [facility] [address]  
[address] [facility] [address] [facility] [address] [address] Upcoming Health Maintenance Date Due Pneumococcal 19-64 Medium Risk (1 of 1 - PPSV23) 11/4/1992 DTaP/Tdap/Td series (2 - Td) 11/2/2027 Allergies as of 11/6/2017  Review Complete On: 11/6/2017 By: Oswaldo Boudreaux MD  
  
 Severity Noted Reaction Type Reactions Bactrim [Sulfamethoprim Ds]  02/18/2011    Other (comments) Current Immunizations  Reviewed on 10/10/2017 Name Date Influenza Vaccine 11/26/2013, 1/16/2013 Influenza Vaccine (Quad) 11/24/2015  2:21 PM  
 Influenza Vaccine (Quad) PF 10/10/2017, 11/16/2016 Influenza Vaccine Split 11/29/2011 Td 10/5/2010 Tdap 11/2/2017  5:34 PM  
  
 Not reviewed this visit You Were Diagnosed With   
  
 Codes Comments Laceration of right ankle, initial encounter    -  Primary ICD-10-CM: V45.227K ICD-9-CM: 891.0 Vitals BP Pulse Temp Resp Height(growth percentile) Weight(growth percentile) 126/82 (BP 1 Location: Left arm, BP Patient Position: Sitting) 87 98.2 °F (36.8 °C) 12 6' 1\" (1.854 m) 221 lb 9.6 oz (100.5 kg) BMI Smoking Status 29.24 kg/m2 Never Smoker Vitals History BMI and BSA Data Body Mass Index Body Surface Area  
 29.24 kg/m 2 2.28 m 2 Preferred Pharmacy Pharmacy Name Phone [facility] [address], [address] [place] [address] [address] [facility] [address] [phone] Your Updated Medication List  
  
   
This list is accurate as of: 11/6/17  2:47 PM.  Always use your most recent med list.  
  
  
  
  
 cephALEXin 500 mg capsule Commonly known as:  Kevon Frohlich Take 1 Cap by mouth four (4) times daily. LORazepam 2 mg tablet Commonly known as:  ATIVAN Take 1 Tab by mouth nightly as needed for Anxiety. Max Daily Amount: 2 mg.  
  
 oxyCODONE-acetaminophen 5-325 mg per tablet Commonly known as:  PERCOCET Take 1 Tab by mouth every six (6) hours as needed for Pain. Max Daily Amount: 4 Tabs. sertraline 50 mg tablet Commonly known as:  ZOLOFT Take 1 Tab by mouth daily. Prescriptions Sent to Pharmacy Refills  
 cephALEXin (KEFLEX) 500 mg capsule 0 Sig: Take 1 Cap by mouth four (4) times daily. Class: Normal  
 Pharmacy: [address] [facility], [address] [facility] #: [phone] Route: Oral  
  
Introducing [facility]! [facility] introduces [employer] patient portal. Now you can access parts of your medical record, email your doctor's office, and request medication refills online. 1. In your internet browser, go to [email or web address] "[email or web address] 2. Click on the First Time User? Click Here link in the Sign In box. You will see the New Member Sign Up page. 3. Enter your [employer] Access Code exactly as it appears below. You will not need to use this code after youve completed the sign-up process. If you do not sign up before the expiration date, you must request a new code. · [employer] Access Code: [email or web address] Expires: 1/8/2018  9:47 AM 
 
4. Enter the last four digits of your Social Security Number (xxxx) and Date of Birth (mm/dd/yyyy) as indicated and click Submit. You will be taken to the next sign-up page. 5. Create a Digital Patht ID. This will be your [employer] login ID and cannot be changed, so think of one that is secure and easy to remember. 6. Create a [employer] password. You can change your password at any time. 7. Enter your Password Reset Question and Answer.  This can be used at a later time if you forget your password. 8. Enter your e-mail address. You will receive e-mail notification when new information is available in [address] 9. Click Sign Up. You can now view and download portions of your medical record. 10. Click the Download Summary menu link to download a portable copy of your medical information. If you have questions, please visit the Frequently Asked Questions section of the [employer] website. Remember, [employer] is NOT to be used for urgent needs. For medical emergencies, dial 911. Now available from your iPhone and Android! Please provide this summary of care documentation to your next provider. Your primary care clinician is listed as Jonathan Iraheta. If you have any questions after today's visit, please call [phone].

## 2017-11-07 NOTE — PROGRESS NOTES
HISTORY OF PRESENT ILLNESS  Dina Burnham is a 38 y.o. male. HPI  Presents with swelling of his right Achilles region and foot and ankle. Was seen on November 2 with a deep laceration from a piece of glass to his posterior right upper Achilles region. It was sutured in the emergency room. Tdap was given in the emergency room. Reports mild swelling and increasing redness around the region of the laceration. Denies any discharge, fever, chills. He is concerned about some bruising of his bilateral heel region. He is concerned that the swelling seems to be getting worse. Review of Systems   Constitutional: Negative for diaphoresis, malaise/fatigue and weight loss. Eyes: Negative for blurred vision. Respiratory: Negative for shortness of breath. Cardiovascular: Negative for chest pain. Gastrointestinal: Negative for abdominal pain. Genitourinary: Negative for flank pain and frequency. Musculoskeletal: Negative for myalgias. Skin: Negative for rash. Neurological: Negative for dizziness, weakness and headaches. Psychiatric/Behavioral: The patient is not nervous/anxious. All other systems reviewed and are negative. Physical Exam   Constitutional: He is oriented to person, place, and time. He appears well-developed and well-nourished. No distress. Cardiovascular: Normal rate. Pulmonary/Chest: Effort normal.   Musculoskeletal: He exhibits no edema. Legs:  Healing laceration of his right lower extremity with mild surrounding erythema which may be streaking somewhat up his calf. There is mild ecchymosis of his bilateral heels laterally. Neurological: He is alert and oriented to person, place, and time. Psychiatric: He has a normal mood and affect. His behavior is normal. Judgment and thought content normal.   Nursing note and vitals reviewed. Mild edema noted of his dorsal right foot    ASSESSMENT and PLAN  Diagnoses and all orders for this visit:    1.  Laceration of right ankle, initial encounter   Mild edema is expected because of the injury. may be mild cellulitis   return to clinic in 1 week for suture removal.  Start Keflex. Elevate as much as possible. Return to clinic if swelling seems to markedly worsen. .-     cephALEXin (KEFLEX) 500 mg capsule; Take 1 Cap by mouth four (4) times daily.       current treatment plan is effective, no change in therapy  the following changes in treatment are made:

## 2017-11-13 ENCOUNTER — OFFICE VISIT (OUTPATIENT)
Dept: INTERNAL MEDICINE CLINIC | Age: 42
End: 2017-11-13

## 2017-11-13 VITALS
RESPIRATION RATE: 12 BRPM | TEMPERATURE: 97.6 F | SYSTOLIC BLOOD PRESSURE: 123 MMHG | BODY MASS INDEX: 29.29 KG/M2 | DIASTOLIC BLOOD PRESSURE: 75 MMHG | HEIGHT: 73 IN | WEIGHT: 221 LBS | HEART RATE: 69 BPM

## 2017-11-13 DIAGNOSIS — S91.011D LACERATION OF RIGHT ANKLE, SUBSEQUENT ENCOUNTER: Primary | ICD-10-CM

## 2017-11-14 NOTE — PROGRESS NOTES
HISTORY OF PRESENT ILLNESS  Garth Georges is a 38 y.o. male. HPI  Presents for f/u with swelling of his right Achilles region and foot and ankle. Was seen on November 2 with a deep laceration from a piece of glass to his posterior right heel, just lateral to  Achilles region. It was sutured in the emergency room. Tdap was given in the emergency room. Reports mild swelling and increasing redness around the region of the laceration. Denies any discharge, fever, chills. He is concerned about some bruising of his bilateral heel region. Tolerating keflex for 1` week for possible cellulitis. Redness and pain are improving    Review of Systems   Constitutional: Negative for diaphoresis, malaise/fatigue and weight loss. Eyes: Negative for blurred vision. Respiratory: Negative for shortness of breath. Cardiovascular: Negative for chest pain. Gastrointestinal: Negative for abdominal pain. Genitourinary: Negative for flank pain and frequency. Musculoskeletal: Negative for myalgias. Skin: Negative for rash. Neurological: Negative for dizziness, weakness and headaches. Psychiatric/Behavioral: The patient is not nervous/anxious. All other systems reviewed and are negative. Physical Exam   Constitutional: He is oriented to person, place, and time. He appears well-developed and well-nourished. No distress. Cardiovascular: Normal rate. Pulmonary/Chest: Effort normal.   Musculoskeletal: He exhibits no edema. Legs:  Healing laceration of his right lower extremity with NO surrounding erythema. There is mild ecchymosis of his bilateral heels laterally. Neurological: He is alert and oriented to person, place, and time. Psychiatric: He has a normal mood and affect. His behavior is normal. Judgment and thought content normal.   Nursing note and vitals reviewed. Mild edema noted of his dorsal right foot    ASSESSMENT and PLAN  Diagnoses and all orders for this visit:    1.  Laceration of right ankle, initial encounter   Mild edema is expected because of the injury. Removed 7 sutures with mild bleeding. Cellulitis is resolved. complete Keflex. Elevate as much as possible. Return to clinic if swelling seems to markedly worsen. .-     cephALEXin (KEFLEX) 500 mg capsule; Take 1 Cap by mouth four (4) times daily.       current treatment plan is effective, no change in therapy  the following changes in treatment are made:

## 2018-02-01 ENCOUNTER — OFFICE VISIT (OUTPATIENT)
Dept: INTERNAL MEDICINE CLINIC | Age: 43
End: 2018-02-01

## 2018-02-01 VITALS
TEMPERATURE: 98.5 F | HEART RATE: 80 BPM | WEIGHT: 218.5 LBS | HEIGHT: 73 IN | DIASTOLIC BLOOD PRESSURE: 87 MMHG | RESPIRATION RATE: 18 BRPM | BODY MASS INDEX: 28.96 KG/M2 | OXYGEN SATURATION: 96 % | SYSTOLIC BLOOD PRESSURE: 131 MMHG

## 2018-02-01 DIAGNOSIS — J21.9 ACUTE BRONCHIOLITIS DUE TO UNSPECIFIED ORGANISM: ICD-10-CM

## 2018-02-01 DIAGNOSIS — J01.00 ACUTE NON-RECURRENT MAXILLARY SINUSITIS: Primary | ICD-10-CM

## 2018-02-01 DIAGNOSIS — J45.21 MILD INTERMITTENT ASTHMA WITH EXACERBATION: ICD-10-CM

## 2018-02-01 RX ORDER — ALBUTEROL SULFATE 90 UG/1
1 AEROSOL, METERED RESPIRATORY (INHALATION)
Qty: 1 INHALER | Refills: 4 | Status: SHIPPED | OUTPATIENT
Start: 2018-02-01 | End: 2018-12-04 | Stop reason: ALTCHOICE

## 2018-02-01 RX ORDER — AMOXICILLIN AND CLAVULANATE POTASSIUM 875; 125 MG/1; MG/1
1 TABLET, FILM COATED ORAL EVERY 12 HOURS
Qty: 14 TAB | Refills: 0 | Status: SHIPPED | OUTPATIENT
Start: 2018-02-01 | End: 2018-07-11 | Stop reason: ALTCHOICE

## 2018-02-01 RX ORDER — CLOMIPHENE CITRATE 50 MG/1
TABLET ORAL
COMMUNITY
Start: 2016-11-18 | End: 2018-02-01 | Stop reason: ALTCHOICE

## 2018-02-01 RX ORDER — PREDNISONE 10 MG/1
TABLET ORAL
Qty: 21 TAB | Refills: 0 | Status: SHIPPED | OUTPATIENT
Start: 2018-02-01 | End: 2018-02-06 | Stop reason: SDUPTHER

## 2018-02-01 NOTE — MR AVS SNAPSHOT
[address] [facility] 
 
 
 [address] [place] [address] 
[phone] Patient: Bisi Oscar MRN:  [id number] Visit Information Date & Time Provider Department Dept. Phone Encounter #  
 2/1/2018  2:00 PM Annamarie Eddy MD [facility] [address]  [id number] Upcoming Health Maintenance Date Due Pneumococcal 19-64 Medium Risk (1 of 1 - PPSV23) 11/4/1992 DTaP/Tdap/Td series (2 - Td) 11/2/2027 Allergies as of 2/1/2018  Review Complete On: 2/1/2018 By: Annamarie Eddy MD  
  
 Severity Noted Reaction Type Reactions Bactrim [Sulfamethoprim Ds]  02/18/2011    Other (comments) Current Immunizations  Reviewed on 10/10/2017 Name Date Influenza Vaccine 10/1/2017, 11/26/2013, 1/16/2013 Influenza Vaccine (Quad) 11/24/2015  2:21 PM  
 Influenza Vaccine (Quad) PF 10/10/2017, 11/16/2016 Influenza Vaccine Split 11/29/2011 Td 10/5/2010 Tdap 11/2/2017  5:34 PM  
  
 Not reviewed this visit You Were Diagnosed With   
  
 Codes Comments Acute non-recurrent maxillary sinusitis    -  Primary ICD-10-CM: J01.00 ICD-9-CM: 461.0 Acute bronchiolitis due to unspecified organism     ICD-10-CM: J21.9 ICD-9-CM: 466.19 Mild intermittent asthma with exacerbation     ICD-10-CM: J45.21 ICD-9-CM: 575.04 Vitals BP Pulse Temp Resp Height(growth percentile) Weight(growth percentile) 131/87 (BP 1 Location: Left arm, BP Patient Position: Sitting) 80 98.5 °F (36.9 °C) (Oral) 18 6' 1\" (1.854 m) 218 lb 8 oz (99.1 kg) SpO2 BMI Smoking Status 96% 28.83 kg/m2 Never Smoker Vitals History BMI and BSA Data Body Mass Index Body Surface Area  
 28.83 kg/m 2 2.26 m 2 Preferred Pharmacy Pharmacy Name Phone [address], [facility] [place] 2[place] [address] [facility] [address] [phone] Your Updated Medication List  
  
   
 This list is accurate as of: 2/1/18  2:38 PM.  Always use your most recent med list.  
  
  
  
  
 albuterol 90 mcg/actuation inhaler Commonly known as:  PROVENTIL HFA, VENTOLIN HFA, PROAIR HFA Take 1 Puff by inhalation every four (4) hours as needed for Wheezing. amoxicillin-clavulanate 875-125 mg per tablet Commonly known as:  AUGMENTIN Take 1 Tab by mouth every twelve (12) hours. guaiFENesin 1,200 mg Ta12 ER tablet Commonly known as:  Gt & Gt Take  by mouth two (2) times a day. LORazepam 2 mg tablet Commonly known as:  ATIVAN Take 1 Tab by mouth nightly as needed for Anxiety. Max Daily Amount: 2 mg. predniSONE 10 mg dose pack Commonly known as:  STERAPRED DS See administration instruction per 10mg dose pack  
  
 sertraline 50 mg tablet Commonly known as:  ZOLOFT Take 1 Tab by mouth daily. Prescriptions Sent to Pharmacy Refills  
 amoxicillin-clavulanate (AUGMENTIN) 875-125 mg per tablet 0 Sig: Take 1 Tab by mouth every twelve (12) hours. Class: Normal  
 Pharmacy: [address] [place] [address], [address] [place] [address] Ph #: [phone] Route: Oral  
 predniSONE (STERAPRED DS) 10 mg dose pack 0 Sig: See administration instruction per 10mg dose pack Class: Normal  
 Pharmacy: [address] [place] [address] [facility] Ph #: [phone]  
 albuterol (PROVENTIL HFA, VENTOLIN HFA, PROAIR HFA) 90 mcg/actuation inhaler 4 Sig: Take 1 Puff by inhalation every four (4) hours as needed for Wheezing. Class: Normal  
 Pharmacy: [address] [place] [address], [address] [place] [address] Ph #: [phone] Route: Inhalation Introducing [facility]! [facility] introduces [employer] patient portal. Now you can access parts of your medical record, email your doctor's office, and request medication refills online.    
 
1. In your internet browser, go to [email or web address] 2. Click on the First Time User? Click Here link in the Sign In box. You will see the New Member Sign Up page. 3. Enter your [employer] Access Code exactly as it appears below. You will not need to use this code after youve completed the sign-up process. If you do not sign up before the expiration date, you must request a new code. · [employer] Access Code: [email or web address] Expires: 5/2/2018  2:38 PM 
 
4. Enter the last four digits of your Social Security Number (xxxx) and Date of Birth (mm/dd/yyyy) as indicated and click Submit. You will be taken to the next sign-up page. 5. Create a [employer] ID. This will be your [employer] login ID and cannot be changed, so think of one that is secure and easy to remember. 6. Create a [employer] password. You can change your password at any time. 7. Enter your Password Reset Question and Answer. This can be used at a later time if you forget your password. 8. Enter your e-mail address. You will receive e-mail notification when new information is available in [address] 9. Click Sign Up. You can now view and download portions of your medical record. 10. Click the Download Summary menu link to download a portable copy of your medical information. If you have questions, please visit the Frequently Asked Questions section of the [employer] website. Remember, [employer] is NOT to be used for urgent needs. For medical emergencies, dial 911. Now available from your iPhone and Android! Please provide this summary of care documentation to your next provider. Your primary care clinician is listed as Marguerite Alston. If you have any questions after today's visit, please call [phone].

## 2018-02-01 NOTE — PROGRESS NOTES
HISTORY OF PRESENT ILLNESS  Malika Teixeira is a 38 y.o. male. HPI  Upper respiratory illness:  Malika Teixeira presents with complaints of congestion, post nasal drip, cough described as productive of yellow sputum and yellow nasal discharge for 3 days. no nausea and no vomiting . he has not had  sore throat, myalgias, fever and chills. Symptoms are moderate. Over-the-counter remedies including albuterol, amox, prednisone   has been used with poor relief of symptoms. Drinking plenty of fluids: yes  Asthma?:  Yes - increased wheezing  non-smoker  Contacts with similar infections: no     Patient Active Problem List   Diagnosis Code    Depression F32.9    Alopecia, male pattern L64.9    Asthma J45.909    HSV-2 (herpes simplex virus 2) infection B00.9    Insomnia G47.00    Restless legs syndrome (RLS) G25.81    Primary insomnia F51.01    Male hypogonadism E29.1    Eczema L30.9    GERD (gastroesophageal reflux disease) K21.9    RLS (restless legs syndrome) G25.81    Depression with anxiety F41.8     Past Medical History:   Diagnosis Date    Allergic rhinitis     Alopecia, male pattern     Asthma     mild intermittent    Depression with anxiety     took wellbutrin, zoloft. anger management issues    Eczema     GERD (gastroesophageal reflux disease)     HSV-2 (herpes simplex virus 2) infection     Insomnia     Male hypogonadism     took clomid 2016    RLS (restless legs syndrome)     saw Dr. Edelmira Brunner 8/19/15. failed mirapex     Allergies   Allergen Reactions    Bactrim [Sulfamethoprim Ds] Other (comments)     Current Outpatient Prescriptions on File Prior to Visit   Medication Sig Dispense Refill    sertraline (ZOLOFT) 50 mg tablet Take 1 Tab by mouth daily. 90 Tab 1    LORazepam (ATIVAN) 2 mg tablet Take 1 Tab by mouth nightly as needed for Anxiety. Max Daily Amount: 2 mg. 90 Tab 1     No current facility-administered medications on file prior to visit.       Social History   Substance Use Topics    Smoking status: Never Smoker    Smokeless tobacco: Never Used    Alcohol use Yes      Comment: occasionally           ROS    Physical Exam   Constitutional: He appears well-developed and well-nourished. No distress. /87 (BP 1 Location: Left arm, BP Patient Position: Sitting)  Pulse 80  Temp 98.5 °F (36.9 °C) (Oral)   Resp 18  Ht 6' 1\" (1.854 m)  Wt 218 lb 8 oz (99.1 kg)  SpO2 96%  BMI 28.83 kg/m2   HENT:   Head: Normocephalic and atraumatic. Right Ear: Tympanic membrane and ear canal normal. No decreased hearing is noted. Left Ear: Tympanic membrane and ear canal normal. No decreased hearing is noted. Nose: Mucosal edema and rhinorrhea present. No epistaxis. Right sinus exhibits maxillary sinus tenderness (mild). Right sinus exhibits no frontal sinus tenderness. Left sinus exhibits no maxillary sinus tenderness and no frontal sinus tenderness. Mouth/Throat: Uvula is midline and mucous membranes are normal. No oral lesions. Normal dentition. Posterior oropharyngeal erythema present. No oropharyngeal exudate, posterior oropharyngeal edema or tonsillar abscesses. Eyes: Conjunctivae are normal. Pupils are equal, round, and reactive to light. Right eye exhibits no discharge. Left eye exhibits no discharge. No scleral icterus. Neck: Neck supple. Cardiovascular: Normal rate, regular rhythm and normal heart sounds. Pulmonary/Chest: Effort normal. No accessory muscle usage. No respiratory distress. He has no decreased breath sounds. He has wheezes in the right lower field. He has no rhonchi. He has no rales. Lymphadenopathy:     He has no cervical adenopathy. Neurological: He is alert. Skin: Skin is warm and dry. He is not diaphoretic. Nursing note and vitals reviewed. ASSESSMENT and PLAN  Diagnoses and all orders for this  Wilmarta Slice was diagnosed with an upper respiratory infection.   he is advised to drink plenty of water, use shower steam or humidifier to loosen secretions, saline nasal lavage 3 times daily and get plenty of rest.  he may use mucinex 1200mg twice daily along with tylenol or advil as needed for fever and pain. Written instructions were given to the patient emphasizing these recommendations. 1. Acute non-recurrent maxillary sinusitis  -     amoxicillin-clavulanate (AUGMENTIN) 875-125 mg per tablet; Take 1 Tab by mouth every twelve (12) hours. -     predniSONE (STERAPRED DS) 10 mg dose pack; See administration instruction per 10mg dose pack    2. Acute bronchiolitis due to unspecified organism  -     amoxicillin-clavulanate (AUGMENTIN) 875-125 mg per tablet; Take 1 Tab by mouth every twelve (12) hours. -     predniSONE (STERAPRED DS) 10 mg dose pack; See administration instruction per 10mg dose pack    3. Mild intermittent asthma with exacerbation  -     predniSONE (STERAPRED DS) 10 mg dose pack; See administration instruction per 10mg dose pack  -     albuterol (PROVENTIL HFA, VENTOLIN HFA, PROAIR HFA) 90 mcg/actuation inhaler; Take 1 Puff by inhalation every four (4) hours as needed for Wheezing.       Follow-up Disposition: Not on File

## 2018-02-06 DIAGNOSIS — J01.00 ACUTE NON-RECURRENT MAXILLARY SINUSITIS: ICD-10-CM

## 2018-02-06 DIAGNOSIS — J21.9 ACUTE BRONCHIOLITIS DUE TO UNSPECIFIED ORGANISM: ICD-10-CM

## 2018-02-06 DIAGNOSIS — J45.21 MILD INTERMITTENT ASTHMA WITH EXACERBATION: ICD-10-CM

## 2018-02-06 RX ORDER — PREDNISONE 10 MG/1
TABLET ORAL
Qty: 21 TAB | Refills: 0 | Status: SHIPPED | OUTPATIENT
Start: 2018-02-06 | End: 2018-07-11 | Stop reason: ALTCHOICE

## 2018-02-06 NOTE — TELEPHONE ENCOUNTER
Patient reports a slight improvement after completing medications. He stated has slight wheezing and congestion that is on going. Requesting a refill on prednisone.

## 2018-02-06 NOTE — TELEPHONE ENCOUNTER
Patient called stating he is still  Sick and would like a refill on prednisone sent to [address] [place] [address] Drive, [address]ila [address] Phone: [phone]

## 2018-03-30 RX ORDER — SERTRALINE HYDROCHLORIDE 50 MG/1
50 TABLET, FILM COATED ORAL DAILY
Qty: 90 TAB | Refills: 0 | Status: SHIPPED | OUTPATIENT
Start: 2018-03-30 | End: 2018-07-06 | Stop reason: SDUPTHER

## 2018-05-15 ENCOUNTER — TELEPHONE (OUTPATIENT)
Dept: INTERNAL MEDICINE CLINIC | Age: 43
End: 2018-05-15

## 2018-05-15 DIAGNOSIS — F41.9 ANXIETY: Primary | ICD-10-CM

## 2018-05-15 DIAGNOSIS — F51.01 PRIMARY INSOMNIA: ICD-10-CM

## 2018-05-16 RX ORDER — TADALAFIL 20 MG/1
20 TABLET ORAL AS NEEDED
Qty: 8 TAB | Refills: 4 | Status: SHIPPED | OUTPATIENT
Start: 2018-05-16 | End: 2018-07-11 | Stop reason: ALTCHOICE

## 2018-05-16 RX ORDER — LORAZEPAM 2 MG/1
TABLET ORAL
Qty: 30 TAB | Refills: 1 | OUTPATIENT
Start: 2018-05-16 | End: 2018-07-11 | Stop reason: SDUPTHER

## 2018-05-16 NOTE — TELEPHONE ENCOUNTER
----- Message from [facility] sent at 5/16/2018  1:45 PM EDT -----  Regarding: Dr. Triston Levi [phone] stated no directions were left on the vm for pt's \"Lorazepam\" and would like clarification.

## 2018-05-16 NOTE — TELEPHONE ENCOUNTER
rx cialis sent  Please call in lorazepam 2 mg #30 pills (not 90) with refill as listed.   appt needed as scheduled in July

## 2018-07-06 RX ORDER — SERTRALINE HYDROCHLORIDE 50 MG/1
TABLET, FILM COATED ORAL
Qty: 90 TAB | Refills: 0 | Status: SHIPPED | OUTPATIENT
Start: 2018-07-06 | End: 2018-07-11 | Stop reason: SDUPTHER

## 2018-07-11 ENCOUNTER — OFFICE VISIT (OUTPATIENT)
Dept: INTERNAL MEDICINE CLINIC | Age: 43
End: 2018-07-11

## 2018-07-11 VITALS
HEIGHT: 73 IN | WEIGHT: 220 LBS | BODY MASS INDEX: 29.16 KG/M2 | SYSTOLIC BLOOD PRESSURE: 125 MMHG | OXYGEN SATURATION: 94 % | RESPIRATION RATE: 18 BRPM | DIASTOLIC BLOOD PRESSURE: 82 MMHG | TEMPERATURE: 97.7 F | HEART RATE: 77 BPM

## 2018-07-11 DIAGNOSIS — F51.01 PRIMARY INSOMNIA: ICD-10-CM

## 2018-07-11 DIAGNOSIS — G25.81 RESTLESS LEGS SYNDROME (RLS): ICD-10-CM

## 2018-07-11 DIAGNOSIS — F41.8 DEPRESSION WITH ANXIETY: ICD-10-CM

## 2018-07-11 DIAGNOSIS — F41.9 ANXIETY: ICD-10-CM

## 2018-07-11 DIAGNOSIS — Z00.00 WELL ADULT EXAM: Primary | ICD-10-CM

## 2018-07-11 DIAGNOSIS — L20.82 FLEXURAL ECZEMA: ICD-10-CM

## 2018-07-11 DIAGNOSIS — E29.1 MALE HYPOGONADISM: ICD-10-CM

## 2018-07-11 RX ORDER — TRIAMCINOLONE ACETONIDE 1 MG/G
CREAM TOPICAL 2 TIMES DAILY
Qty: 30 G | Refills: 2 | Status: SHIPPED | OUTPATIENT
Start: 2018-07-11 | End: 2020-04-09 | Stop reason: ALTCHOICE

## 2018-07-11 RX ORDER — ROPINIROLE 1 MG/1
3 TABLET, FILM COATED ORAL
Qty: 90 TAB | Refills: 0
Start: 2018-07-11 | End: 2018-07-11 | Stop reason: SDUPTHER

## 2018-07-11 RX ORDER — SERTRALINE HYDROCHLORIDE 100 MG/1
TABLET, FILM COATED ORAL
Qty: 90 TAB | Refills: 1 | Status: SHIPPED | OUTPATIENT
Start: 2018-07-11 | End: 2018-09-18 | Stop reason: SDUPTHER

## 2018-07-11 RX ORDER — LORAZEPAM 2 MG/1
TABLET ORAL
Qty: 30 TAB | Refills: 0 | Status: SHIPPED | OUTPATIENT
Start: 2018-07-11 | End: 2018-08-29 | Stop reason: SDUPTHER

## 2018-07-11 RX ORDER — ROPINIROLE 1 MG/1
3 TABLET, FILM COATED ORAL
Qty: 90 TAB | Refills: 0 | Status: SHIPPED | OUTPATIENT
Start: 2018-07-11 | End: 2018-08-29 | Stop reason: SDUPTHER

## 2018-07-11 NOTE — PROGRESS NOTES
Tej De Dios is a 38 y.o. male  Presenting for his annual checkup and health maintenance review and follow-up    Reports stress regarding his 22 yo stepson who is using drugs and is aggressive at times. He is  4 years. He is at times overwhelmed. He is seeing a counselor with his wife and they advised they need to keep savana away from them. The stepson is living at times with his girlfriend. Taking zoloft 50 mg daily and lorazepam 2mg pill - takes 1/2 pill prn for insomnia and RLS  Has 13 month old daughter    Hx of RLS. Was given requip but it was tid and he did not try it. He would like to not take lorazepam.    Hx of low T. Not taking clomid. Exercise: moderately active  Diet: generally follows a low fat low cholesterol diet  Health Maintenance   Topic Date Due    Pneumococcal 19-64 Medium Risk (1 of 1 - PPSV23) 11/04/1992    DTaP/Tdap/Td series (2 - Td) 11/02/2027     Health Maintenance reviewed  Last digital rectal exam:  none  Lab Results   Component Value Date/Time    Prostate Specific Ag 0.5 10/08/2014 08:26 AM    Prostate Specific Ag 0.5 09/30/2009 10:54 AM       Vaccinations reviewed  Immunization History   Administered Date(s) Administered    Influenza Vaccine 01/16/2013, 11/26/2013, 10/01/2017    Influenza Vaccine (Quad) 11/24/2015    Influenza Vaccine (Quad) PF 11/16/2016, 10/10/2017    Influenza Vaccine Split 11/29/2011    Td 10/05/2010    Tdap 11/02/2017       Past Medical History:   Diagnosis Date    Allergic rhinitis     Alopecia, male pattern     Asthma     mild intermittent    Depression with anxiety     took wellbutrin, zoloft. anger management issues    Eczema     GERD (gastroesophageal reflux disease)     HSV-2 (herpes simplex virus 2) infection     Insomnia     Male hypogonadism     took clomid 2016    RLS (restless legs syndrome)     saw Dr. Cleveland Aburto 8/19/15.   failed mirapex      has a past surgical history that includes hx colonoscopy (07/08/2013). Bactrim [sulfamethoprim ds]   Current Outpatient Prescriptions   Medication Sig    sertraline (ZOLOFT) 50 mg tablet TAKE 1 TABLET BY MOUTH DAILY    LORazepam (ATIVAN) 2 mg tablet TAKE 1 TABLET DAILY AS NEEDED FOR ANXIETY  Indications: anxiety    guaiFENesin (MUCINEX) 1,200 mg Ta12 ER tablet Take 1,200 mg by mouth as needed.  albuterol (PROVENTIL HFA, VENTOLIN HFA, PROAIR HFA) 90 mcg/actuation inhaler Take 1 Puff by inhalation every four (4) hours as needed for Wheezing.  tadalafil (CIALIS) 20 mg tablet Take 1 Tab by mouth as needed.  predniSONE (STERAPRED DS) 10 mg dose pack See administration instruction per 10mg dose pack (Patient taking differently: Take 10 mg by mouth as needed. See administration instruction per 10mg dose pack)    amoxicillin-clavulanate (AUGMENTIN) 875-125 mg per tablet Take 1 Tab by mouth every twelve (12) hours. No current facility-administered medications for this visit. SOCIAL HX:  reports that he has never smoked. He has never used smokeless tobacco. He reports that he drinks alcohol. FAMILY HX: family history is not on file. He was adopted. Review of Systems - History obtained from the patient  General ROS: negative for - night sweats, weight gain or weight loss  Cardiovascular ROS: no chest pain, dyspnea on exertion, edema    Physical exam  Blood pressure 125/82, pulse 77, temperature 97.7 °F (36.5 °C), temperature source Oral, resp. rate 18, height 6' 1\" (1.854 m), weight 220 lb (99.8 kg), SpO2 94 %. Wt Readings from Last 3 Encounters:   07/11/18 220 lb (99.8 kg)   02/01/18 218 lb 8 oz (99.1 kg)   11/13/17 221 lb (100.2 kg)     He appears well, alert and oriented x 3, pleasant and cooperative. Vitals as noted. No rashes or significant lesions. Neck supple and free of adenopathy, or masses. No thyromegaly or carotid bruits. Cranial nerves normal. Lungs are clear to auscultation.  Heart sounds are normal with no murmurs, clicks, gallops or rubs. Abdomen is soft, non- tender, with no masses or organomegaly. Extremities, peripheral pulses and reflexes are normal.   Skin with scaling of right  of hand    Diagnoses and all orders for this visit:    1. Well adult exam  -     LIPID PANEL  -     METABOLIC PANEL, COMPREHENSIVE  -     TSH 3RD GENERATION  -     CBC W/O DIFF    2. Primary insomnia- due to anxiety and RLS. Will work on this with other meds. Refilled lorazepam for now #30, no Rf. Potential medication side effects were discussed with the patient; let me know if any occur.  profile was accessed online for [facility] and reviewed by me during this encounter. I did not see evidence of inappropriate or suspicious controlled substance prescription activity.  -     LORazepam (ATIVAN) 2 mg tablet; TAKE 1 TABLET DAILY AS NEEDED FOR ANXIETY  Indications: anxiety    3. Depression with anxiety   Uncontrolled due to situation. Cont couples counseling. Increase zoloft to 100 mg.      -     sertraline (ZOLOFT) 100 mg tablet; TAKE 1 TABLET BY MOUTH DAILY    4. Anxiety- as above. Use sparing lorazepam.  -     LORazepam (ATIVAN) 2 mg tablet; TAKE 1 TABLET DAILY AS NEEDED FOR ANXIETY  Indications: anxiety    5. Restless legs syndrome (RLS)  -     FERRITIN  -     rOPINIRole (REQUIP) 1 mg tablet; Take 3 Tabs by mouth nightly. 6. Flexural eczema- hands. From yard work. -     triamcinolone acetonide (KENALOG) 0.1 % topical cream; Apply  to affected area two (2) times a day. 7. Male hypogonadism  He is off of clomid since 2-3 month ago. Felt better on it - more \"manly\", more energy. Will resume if levels are low.     -     TESTOSTERONE, TOTAL, ADULT MALE    The patient is asked to make an attempt to improve diet and exercise patterns  Avoid tobacco products, excessive alcohol    Return for yearly wellness visits

## 2018-07-11 NOTE — MR AVS SNAPSHOT
[facility] 
 
 
 [address] [place] 100[address] [facility] 
[phone] Patient: Blaine Nieves MRN:  [id number] Visit Information Date & Time Provider Department Dept. Phone Encounter #  
 7/11/2018  9:00 AM Lila Perea MD [facility] [address] [id number] Upcoming Health Maintenance Date Due Pneumococcal 19-64 Medium Risk (1 of 1 - PPSV23) 11/4/1992 DTaP/Tdap/Td series (2 - Td) 11/2/2027 Allergies as of 7/11/2018  Review Complete On: 7/11/2018 By: Brook Brooks LPN Severity Noted Reaction Type Reactions Bactrim [Sulfamethoprim Ds]  02/18/2011    Other (comments) Current Immunizations  Reviewed on 10/10/2017 Name Date Influenza Vaccine 10/1/2017, 11/26/2013, 1/16/2013 Influenza Vaccine (Quad) 11/24/2015  2:21 PM  
 Influenza Vaccine (Quad) PF 10/10/2017, 11/16/2016 Influenza Vaccine Split 11/29/2011 Td 10/5/2010 Tdap 11/2/2017  5:34 PM  
  
 Not reviewed this visit You Were Diagnosed With   
  
 Codes Comments Well adult exam    -  Primary ICD-10-CM: Z00.00 ICD-9-CM: V70.0 Primary insomnia     ICD-10-CM: F51.01 
ICD-9-CM: 307.42 Depression with anxiety     ICD-10-CM: F41.8 ICD-9-CM: 300.4 Anxiety     ICD-10-CM: F41.9 ICD-9-CM: 300.00 Restless legs syndrome (RLS)     ICD-10-CM: G25.81 ICD-9-CM: 333.94 Flexural eczema     ICD-10-CM: L20.82 ICD-9-CM: 691.8 Male hypogonadism     ICD-10-CM: E29.1 ICD-9-CM: 257.2 Vitals BP Pulse Temp Resp Height(growth percentile) Weight(growth percentile) 125/82 (BP 1 Location: Left arm, BP Patient Position: Sitting) 77 97.7 °F (36.5 °C) (Oral) 18 6' 1\" (1.854 m) 220 lb (99.8 kg) SpO2 BMI Smoking Status 94% 29.03 kg/m2 Never Smoker BMI and BSA Data Body Mass Index Body Surface Area  
 29.03 kg/m 2 2.27 m 2 Preferred Pharmacy Pharmacy Name Phone [facility] [address] AT 4 [facility] [phone] Your Updated Medication List  
  
   
This list is accurate as of 7/11/18  9:40 AM.  Always use your most recent med list.  
  
  
  
  
 albuterol 90 mcg/actuation inhaler Commonly known as:  PROVENTIL HFA, VENTOLIN HFA, PROAIR HFA Take 1 Puff by inhalation every four (4) hours as needed for Wheezing. guaiFENesin 1,200 mg Ta12 ER tablet Commonly known as:  Gt & Gt Take 1,200 mg by mouth as needed. LORazepam 2 mg tablet Commonly known as:  ATIVAN  
TAKE 1 TABLET DAILY AS NEEDED FOR ANXIETY  Indications: anxiety  
  
 rOPINIRole 1 mg tablet Commonly known as:  [address] Take 3 Tabs by mouth nightly. sertraline 100 mg tablet Commonly known as:  ZOLOFT  
TAKE 1 TABLET BY MOUTH DAILY  
  
 triamcinolone acetonide 0.1 % topical cream  
Commonly known as:  KENALOG Apply  to affected area two (2) times a day. Prescriptions Printed Refills  
 sertraline (ZOLOFT) 100 mg tablet 1 Sig: TAKE 1 TABLET BY MOUTH DAILY Class: Print LORazepam (ATIVAN) 2 mg tablet 0 Sig: TAKE 1 TABLET DAILY AS NEEDED FOR ANXIETY  Indications: anxiety Class: Print  
 rOPINIRole (REQUIP) 1 mg tablet 0 Sig: Take 3 Tabs by mouth nightly. Class: Print Route: Oral  
  
Prescriptions Sent to Pharmacy Refills  
 triamcinolone acetonide (KENALOG) 0.1 % topical cream 2 Sig: Apply  to affected area two (2) times a day. Class: Normal  
 Pharmacy: [facility] [address] [place] [address] [id number] AT [facility] of [address] ([facility] in [place] Ph #: [phone] Route: Topical  
  
We Performed the Following CBC W/O DIFF [77428 CPT(R)] FERRITIN [77581 CPT(R)] LIPID PANEL [54676 CPT(R)] METABOLIC PANEL, COMPREHENSIVE [90258 CPT(R)] TESTOSTERONE, TOTAL, ADULT MALE [77899 CPT(R)] TSH 3RD GENERATION [48428 CPT(R)] Introducing [facility]! Kaelyn Georgiaaugie introduces [employer] patient portal. Now you can access parts of your medical record, email your doctor's office, and request medication refills online. 1. In your internet browser, go to [email or web address] 2. Click on the First Time User? Click Here link in the Sign In box. You will see the New Member Sign Up page. 3. Enter your [employer] [facility] Access Code exactly as it appears below. You will not need to use this code after youve completed the sign-up process. If you do not sign up before the expiration date, you must request a new code. · [employer] Access Code: [email or web address] Expires: 10/9/2018  9:40 AM 
 
4. Enter the last four digits of your Social Security Number (xxxx) and Date of Birth (mm/dd/yyyy) as indicated and click Submit. You will be taken to the next sign-up page. 5. Create a [employer] ID. This will be your [employer] login ID and cannot be changed, so think of one that is secure and easy to remember. 6. Create a [employer] [facility] password. You can change your password at any time. 7. Enter your Password Reset Question and Answer. This can be used at a later time if you forget your password. 8. Enter your e-mail address. You will receive e-mail notification when new information is available in [address] 9. Click Sign Up. You can now view and download portions of your medical record. 10. Click the Download Summary menu link to download a portable copy of your medical information. If you have questions, please visit the Frequently Asked Questions section of the [employer] website. Remember, [employer] [facility] is NOT to be used for urgent needs. For medical emergencies, dial 911. Now available from your iPhone and Android! Please provide this summary of care documentation to your next provider. Your primary care clinician is listed as Kati Flood. If you have any questions after today's visit, please call [phone].

## 2018-07-12 LAB
ALBUMIN SERPL-MCNC: 4.5 G/DL (ref 3.5–5.5)
ALBUMIN/GLOB SERPL: 2.3 {RATIO} (ref 1.2–2.2)
ALP SERPL-CCNC: 67 IU/L (ref 39–117)
ALT SERPL-CCNC: 22 IU/L (ref 0–44)
AST SERPL-CCNC: 19 IU/L (ref 0–40)
BILIRUB SERPL-MCNC: 0.3 MG/DL (ref 0–1.2)
BUN SERPL-MCNC: 13 MG/DL (ref 6–24)
BUN/CREAT SERPL: 16 (ref 9–20)
CALCIUM SERPL-MCNC: 9.4 MG/DL (ref 8.7–10.2)
CHLORIDE SERPL-SCNC: 107 MMOL/L (ref 96–106)
CHOLEST SERPL-MCNC: 210 MG/DL (ref 100–199)
CO2 SERPL-SCNC: 21 MMOL/L (ref 20–29)
CREAT SERPL-MCNC: 0.83 MG/DL (ref 0.76–1.27)
ERYTHROCYTE [DISTWIDTH] IN BLOOD BY AUTOMATED COUNT: 14.3 % (ref 12.3–15.4)
FERRITIN SERPL-MCNC: 81 NG/ML (ref 30–400)
GLOBULIN SER CALC-MCNC: 2 G/DL (ref 1.5–4.5)
GLUCOSE SERPL-MCNC: 100 MG/DL (ref 65–99)
HCT VFR BLD AUTO: 43.4 % (ref 37.5–51)
HDLC SERPL-MCNC: 41 MG/DL
HGB BLD-MCNC: 14.8 G/DL (ref 13–17.7)
INTERPRETATION, 910389: NORMAL
LDLC SERPL CALC-MCNC: 147 MG/DL (ref 0–99)
MCH RBC QN AUTO: 29.5 PG (ref 26.6–33)
MCHC RBC AUTO-ENTMCNC: 34.1 G/DL (ref 31.5–35.7)
MCV RBC AUTO: 87 FL (ref 79–97)
PLATELET # BLD AUTO: 322 X10E3/UL (ref 150–379)
POTASSIUM SERPL-SCNC: 4.4 MMOL/L (ref 3.5–5.2)
PROT SERPL-MCNC: 6.5 G/DL (ref 6–8.5)
RBC # BLD AUTO: 5.01 X10E6/UL (ref 4.14–5.8)
SODIUM SERPL-SCNC: 142 MMOL/L (ref 134–144)
TESTOST SERPL-MCNC: 272 NG/DL (ref 264–916)
TRIGL SERPL-MCNC: 110 MG/DL (ref 0–149)
TSH SERPL DL<=0.005 MIU/L-ACNC: 1.59 UIU/ML (ref 0.45–4.5)
VLDLC SERPL CALC-MCNC: 22 MG/DL (ref 5–40)
WBC # BLD AUTO: 6.9 X10E3/UL (ref 3.4–10.8)

## 2018-07-15 RX ORDER — CLOMIPHENE CITRATE 50 MG/1
TABLET ORAL
Qty: 45 TAB | Refills: 5 | Status: SHIPPED | OUTPATIENT
Start: 2018-07-15 | End: 2020-04-09 | Stop reason: ALTCHOICE

## 2018-08-28 DIAGNOSIS — F51.01 PRIMARY INSOMNIA: ICD-10-CM

## 2018-08-28 DIAGNOSIS — F41.9 ANXIETY: ICD-10-CM

## 2018-08-28 DIAGNOSIS — G25.81 RESTLESS LEGS SYNDROME (RLS): ICD-10-CM

## 2018-08-28 NOTE — TELEPHONE ENCOUNTER
----- Message from Juliette Franco sent at 8/28/2018  4:18 PM EDT -----  Regarding: Dr. Ronny Yoo  Pt requests a refill of his anxiety medication (pt did not know name/spelling of medication). Also, he wants doctor to know that he has not begun taking his sleeping medicine. Pt's best contact number is [phone].

## 2018-08-29 RX ORDER — LORAZEPAM 2 MG/1
TABLET ORAL
Qty: 30 TAB | Refills: 0 | OUTPATIENT
Start: 2018-08-29 | End: 2018-10-04 | Stop reason: SDUPTHER

## 2018-08-29 RX ORDER — ROPINIROLE 1 MG/1
3 TABLET, FILM COATED ORAL
Qty: 90 TAB | Refills: 0 | OUTPATIENT
Start: 2018-08-29 | End: 2018-12-04 | Stop reason: ALTCHOICE

## 2018-09-18 DIAGNOSIS — F41.8 DEPRESSION WITH ANXIETY: ICD-10-CM

## 2018-09-18 RX ORDER — SERTRALINE HYDROCHLORIDE 100 MG/1
TABLET, FILM COATED ORAL
Qty: 90 TAB | Refills: 1 | Status: SHIPPED | OUTPATIENT
Start: 2018-09-18 | End: 2018-11-13 | Stop reason: SDUPTHER

## 2018-09-18 NOTE — TELEPHONE ENCOUNTER
----- Message from Nancy Jiménez sent at 9/18/2018 12:18 PM EDT -----  Regarding: Dr. Sarah Meehan  Pt is requesting a refill on generic Zoloft. [facility] [phone].  Best contact [phone]

## 2018-10-04 DIAGNOSIS — F51.01 PRIMARY INSOMNIA: ICD-10-CM

## 2018-10-04 DIAGNOSIS — F41.9 ANXIETY: ICD-10-CM

## 2018-10-04 RX ORDER — LORAZEPAM 2 MG/1
TABLET ORAL
Qty: 30 TAB | Refills: 0 | OUTPATIENT
Start: 2018-10-04 | End: 2018-11-13 | Stop reason: SDUPTHER

## 2018-10-08 ENCOUNTER — TELEPHONE (OUTPATIENT)
Dept: INTERNAL MEDICINE CLINIC | Age: 43
End: 2018-10-08

## 2018-11-13 ENCOUNTER — TELEPHONE (OUTPATIENT)
Dept: INTERNAL MEDICINE CLINIC | Age: 43
End: 2018-11-13

## 2018-11-13 DIAGNOSIS — F41.8 DEPRESSION WITH ANXIETY: ICD-10-CM

## 2018-11-13 DIAGNOSIS — F41.9 ANXIETY: ICD-10-CM

## 2018-11-13 DIAGNOSIS — F51.01 PRIMARY INSOMNIA: ICD-10-CM

## 2018-11-13 RX ORDER — LORAZEPAM 2 MG/1
TABLET ORAL
Qty: 30 TAB | Refills: 0 | Status: CANCELLED | OUTPATIENT
Start: 2018-11-13

## 2018-11-13 RX ORDER — LORAZEPAM 2 MG/1
TABLET ORAL
Qty: 30 TAB | Refills: 0 | Status: SHIPPED | OUTPATIENT
Start: 2018-11-13 | End: 2018-12-04 | Stop reason: SDUPTHER

## 2018-11-13 RX ORDER — SERTRALINE HYDROCHLORIDE 100 MG/1
TABLET, FILM COATED ORAL
Qty: 90 TAB | Refills: 0 | Status: SHIPPED | OUTPATIENT
Start: 2018-11-13 | End: 2018-12-04 | Stop reason: SDUPTHER

## 2018-11-13 RX ORDER — SERTRALINE HYDROCHLORIDE 100 MG/1
TABLET, FILM COATED ORAL
Qty: 90 TAB | Refills: 1 | Status: CANCELLED | OUTPATIENT
Start: 2018-11-13

## 2018-11-13 RX ORDER — SERTRALINE HYDROCHLORIDE 100 MG/1
TABLET, FILM COATED ORAL
Qty: 90 TAB | Refills: 1 | Status: SHIPPED | OUTPATIENT
Start: 2018-11-13 | End: 2018-11-13 | Stop reason: SDUPTHER

## 2018-11-13 NOTE — TELEPHONE ENCOUNTER
----- Message from Mynor Ruiz sent at 11/12/2018  4:28 PM EST -----  Regarding: Dr. Severo Chin refill  Contact: [phone]  Pt is requesting Rx refill for both Anxiety medications to be sent to [facility].

## 2018-12-04 ENCOUNTER — OFFICE VISIT (OUTPATIENT)
Dept: INTERNAL MEDICINE CLINIC | Age: 43
End: 2018-12-04

## 2018-12-04 VITALS
HEART RATE: 66 BPM | BODY MASS INDEX: 27.83 KG/M2 | HEIGHT: 73 IN | WEIGHT: 210 LBS | SYSTOLIC BLOOD PRESSURE: 120 MMHG | TEMPERATURE: 97.2 F | RESPIRATION RATE: 18 BRPM | DIASTOLIC BLOOD PRESSURE: 80 MMHG | OXYGEN SATURATION: 96 %

## 2018-12-04 DIAGNOSIS — F51.01 PRIMARY INSOMNIA: ICD-10-CM

## 2018-12-04 DIAGNOSIS — F41.8 DEPRESSION WITH ANXIETY: ICD-10-CM

## 2018-12-04 DIAGNOSIS — E29.1 MALE HYPOGONADISM: ICD-10-CM

## 2018-12-04 DIAGNOSIS — S20.211A CHEST WALL CONTUSION, RIGHT, INITIAL ENCOUNTER: Primary | ICD-10-CM

## 2018-12-04 RX ORDER — SERTRALINE HYDROCHLORIDE 100 MG/1
100 TABLET, FILM COATED ORAL DAILY
Qty: 90 TAB | Refills: 1 | Status: SHIPPED | OUTPATIENT
Start: 2018-12-04 | End: 2019-04-30 | Stop reason: SDUPTHER

## 2018-12-04 RX ORDER — LORAZEPAM 2 MG/1
TABLET ORAL
Qty: 30 TAB | Refills: 2 | Status: SHIPPED | OUTPATIENT
Start: 2018-12-04 | End: 2019-04-30 | Stop reason: SDUPTHER

## 2018-12-04 NOTE — PROGRESS NOTES
All health maintenance and other pertinent information has been reviewed in preparation for today's office visit. Patient presents in the office today for: Chief Complaint Patient presents with  Fall Pt c/o pain from fall on 11/28 at work. Pt injured right side/back. States 3/10 on pain scale. 1. Have you been to the ER, urgent care clinic since your last visit? Hospitalized since your last visit? No 
 
2. Have you seen or consulted any other health care providers outside of the [address] [place] [address] since your last visit? Include any pap smears or colon screening.  No

## 2018-12-04 NOTE — PATIENT INSTRUCTIONS
Body Mass Index: Care Instructions Your Care Instructions Body mass index (BMI) can help you see if your weight is raising your risk for health problems. It uses a formula to compare how much you weigh with how tall you are. · A BMI lower than 18.5 is considered underweight. · A BMI between 18.5 and 24.9 is considered healthy. · A BMI between 25 and 29.9 is considered overweight. A BMI of 30 or higher is considered obese. If your BMI is in the normal range, it means that you have a lower risk for weight-related health problems. If your BMI is in the overweight or obese range, you may be at increased risk for weight-related health problems, such as high blood pressure, heart disease, stroke, arthritis or joint pain, and diabetes. If your BMI is in the underweight range, you may be at increased risk for health problems such as fatigue, lower protection (immunity) against illness, muscle loss, bone loss, hair loss, and hormone problems. BMI is just one measure of your risk for weight-related health problems. You may be at higher risk for health problems if you are not active, you eat an unhealthy diet, or you drink too much alcohol or use tobacco products. Follow-up care is a key part of your treatment and safety. Be sure to make and go to all appointments, and call your doctor if you are having problems. It's also a good idea to know your test results and keep a list of the medicines you take. How can you care for yourself at home? · Practice healthy eating habits. This includes eating plenty of fruits, vegetables, whole grains, lean protein, and low-fat dairy. · If your doctor recommends it, get more exercise. Walking is a good choice. Bit by bit, increase the amount you walk every day. Try for at least 30 minutes on most days of the week. · Do not smoke. Smoking can increase your risk for health problems.  If you need help quitting, talk to your doctor about stop-smoking programs and medicines. These can increase your chances of quitting for good. · Limit alcohol to 2 drinks a day for men and 1 drink a day for women. Too much alcohol can cause health problems. If you have a BMI higher than 25 · Your doctor may do other tests to check your risk for weight-related health problems. This may include measuring the distance around your waist. A waist measurement of more than 40 inches in men or 35 inches in women can increase the risk of weight-related health problems. · Talk with your doctor about steps you can take to stay healthy or improve your health. You may need to make lifestyle changes to lose weight and stay healthy, such as changing your diet and getting regular exercise. If you have a BMI lower than 18.5 · Your doctor may do other tests to check your risk for health problems. · Talk with your doctor about steps you can take to stay healthy or improve your health. You may need to make lifestyle changes to gain or maintain weight and stay healthy, such as getting more healthy foods in your diet and doing exercises to build muscle. Where can you learn more? Go to http://melissa-khloe.info/. Enter S176 in the search box to learn more about \"Body Mass Index: Care Instructions. \" Current as of: October 13, 2016 Content Version: 11.4 © 6822-1077 [employer]. Care instructions adapted under license by [employer] (which disclaims liability or warranty for this information). If you have questions about a medical condition or this instruction, always ask your healthcare professional. [address] any warranty or liability for your use of this information. Bruised Rib: Care Instructions Overview You can get a bruised rib if you fall or get hit, such as in an accident or while playing sports. The medical term for a bruise is \"contusion. \" Small blood vessels get torn and leak blood under the skin. Most people think of a bruise as a black-and-blue area. But bones and muscles can also get bruised. An injury may damage the rib but not cause a bruise that you can see. Sometimes it can be hard to tell if a rib is bruised or broken. The symptoms may be the same. And a broken bone can't always be seen on an X-ray. But the treatment for a bruised rib is often the same as treatment for a broken one. An injury to the ribs can cause pain. The pain may be worse when you breathe deeply, cough, or sneeze. In most cases, a bruised rib will heal on its own. You can take pain medicine while the rib mends. Pain relief allows you to take deep breaths. Follow-up care is a key part of your treatment and safety. Be sure to make and go to all appointments, and call your doctor if you are having problems. It's also a good idea to know your test results and keep a list of the medicines you take. How can you care for yourself at home? · Rest and protect the injured or sore area. Stop, change, or take a break from any activity that causes pain. · Put ice or a cold pack on the area for 10 to 20 minutes at a time. Put a thin cloth between the ice and your skin. · After 2 or 3 days, if your swelling is gone, put a heating pad set on low or a warm cloth on your chest. Some doctors suggest that you go back and forth between hot and cold. Put a thin cloth between the heating pad and your skin. · Ask your doctor if you can take an over-the-counter pain medicine, such as acetaminophen (Tylenol), ibuprofen (Advil, Motrin), or naproxen (Aleve). Be safe with medicines. Read and follow all instructions on the label. · As your pain gets better, slowly return to your normal activities. Be patient. Rib bruises can take weeks or months to heal. If the pain gets worse, it may be a sign that you need to rest a while longer. When should you call for help? [id number] anytime you think you may need emergency care. For example, call if:   · You have severe trouble breathing.  
  Call your doctor now or seek immediate medical care if: 
  · You have trouble breathing.  
  · You have a fever.  
  · You have a new or worse cough.  
  · You have new or worse pain.  
 Watch closely for changes in your health, and be sure to contact your doctor if: 
  · You do not get better as expected. Where can you learn more? Go to http://melissa-khloe.info/. Enter R125 in the search box to learn more about \"Bruised Rib: Care Instructions. \" Current as of: April 19, 2018 Content Version: 11.8 © 7600-1395 [employer]. Care instructions adapted under license by [employer] (which disclaims liability or warranty for this information). If you have questions about a medical condition or this instruction, always ask your healthcare professional. [address] any warranty or liability for your use of this information.

## 2018-12-04 NOTE — PROGRESS NOTES
HISTORY OF PRESENT ILLNESS Presents with right lower posterior rib pain for 7  day(s). He was at work and fell back onto a grate Associated symptoms include: hurts a little to cough and strain Denies SOB, bruising   Treatments tried include: medication not used Anxiety Patient is seen for anxiety disorder. Current treatment includes Zoloft, Ativan and no other therapies. Ongoing symptoms include: none. Patient denies: sweating, chest pain, dizziness, paresthesias, racing thoughts, feelings of losing control, difficulty concentrating, suicidal ideation. Denies substance or alcohol abuse. Reported side effects from the treatment: none. Hypogonadism Started clomid THIS WEEK. Review of Systems All other systems reviewed and are negative, except as noted in HPI Past Medical and Surgical History 
 has a past medical history of Allergic rhinitis, Alopecia, male pattern, Asthma, Depression with anxiety, Eczema, GERD (gastroesophageal reflux disease), HSV-2 (herpes simplex virus 2) infection, Insomnia, Male hypogonadism, and RLS (restless legs syndrome). has a past surgical history that includes hx colonoscopy (07/08/2013). reports that  has never smoked. he has never used smokeless tobacco. He reports that he does not drink alcohol or use drugs. family history is not on file. He was adopted. Physical Exam  
Nursing note and vitals reviewed. Blood pressure 120/80, pulse 66, temperature 97.2 °F (36.2 °C), temperature source Oral, resp. rate 18, height 6' 1\" (1.854 m), weight 210 lb (95.3 kg), SpO2 96 %. Constitutional: In no distress. Eyes: Conjunctivae are normal. 
HEENT:  No LAD or thyromegaly Cardiovascular: Normal rate. regular rhythm. No murmurs No edema Pulmonary/Chest: Effort normal. clear to ausculation blaterally Musculoskeletal:  no edema. Abd: 
Neurological: Alert and oriented. Grossly intact cranial nerves and motor function. Skin: No rash noted. Psychiatric: Normal mood and affect. Behavior is normal.  
 
ASSESSMENT and PLAN Diagnoses and all orders for this visit: 
 
1. Chest wall contusion, right, initial encounter Mild. Reassured. Will take 3-4 weeks to stop hurting 2. Male hypogonadism Started clomid this week (12/2018) Return to clinic in 3 months to repeat your blood work. 3. Depression with anxiety Controlled on current regimen. Continue current medications as written in chart. -     sertraline (ZOLOFT) 100 mg tablet; Take 1 Tab by mouth daily. 4. Primary insomnia -     LORazepam (ATIVAN) 2 mg tablet; TAKE 1 TABLET DAILY AS NEEDED FOR ANXIETY 
 
 
 
lab results and schedule of future lab studies reviewed with patient 
reviewed medications and side effects in detail Return to clinic for further evaluation if new symptoms develop or if current symptoms worsen or fail to resolve. Patient Instructions Body Mass Index: Care Instructions Your Care Instructions Body mass index (BMI) can help you see if your weight is raising your risk for health problems. It uses a formula to compare how much you weigh with how tall you are. · A BMI lower than 18.5 is considered underweight. · A BMI between 18.5 and 24.9 is considered healthy. · A BMI between 25 and 29.9 is considered overweight. A BMI of 30 or higher is considered obese. If your BMI is in the normal range, it means that you have a lower risk for weight-related health problems. If your BMI is in the overweight or obese range, you may be at increased risk for weight-related health problems, such as high blood pressure, heart disease, stroke, arthritis or joint pain, and diabetes. If your BMI is in the underweight range, you may be at increased risk for health problems such as fatigue, lower protection (immunity) against illness, muscle loss, bone loss, hair loss, and hormone problems. BMI is just one measure of your risk for weight-related health problems. You may be at higher risk for health problems if you are not active, you eat an unhealthy diet, or you drink too much alcohol or use tobacco products. Follow-up care is a key part of your treatment and safety. Be sure to make and go to all appointments, and call your doctor if you are having problems. It's also a good idea to know your test results and keep a list of the medicines you take. How can you care for yourself at home? · Practice healthy eating habits. This includes eating plenty of fruits, vegetables, whole grains, lean protein, and low-fat dairy. · If your doctor recommends it, get more exercise. Walking is a good choice. Bit by bit, increase the amount you walk every day. Try for at least 30 minutes on most days of the week. · Do not smoke. Smoking can increase your risk for health problems. If you need help quitting, talk to your doctor about stop-smoking programs and medicines. These can increase your chances of quitting for good. · Limit alcohol to 2 drinks a day for men and 1 drink a day for women. Too much alcohol can cause health problems. If you have a BMI higher than 25 · Your doctor may do other tests to check your risk for weight-related health problems. This may include measuring the distance around your waist. A waist measurement of more than 40 inches in men or 35 inches in women can increase the risk of weight-related health problems. · Talk with your doctor about steps you can take to stay healthy or improve your health. You may need to make lifestyle changes to lose weight and stay healthy, such as changing your diet and getting regular exercise. If you have a BMI lower than 18.5 · Your doctor may do other tests to check your risk for health problems. · Talk with your doctor about steps you can take to stay healthy or improve your health.  You may need to make lifestyle changes to gain or maintain weight and stay healthy, such as getting more healthy foods in your diet and doing exercises to build muscle. Where can you learn more? Go to http://melissa-khloe.info/. Enter S176 in the search box to learn more about \"Body Mass Index: Care Instructions. \" Current as of: October 13, 2016 Content Version: 11.4 © 5125-1070 [employer]. Care instructions adapted under license by [employer] (which disclaims liability or warranty for this information). If you have questions about a medical condition or this instruction, always ask your healthcare professional. [address] any warranty or liability for your use of this information. Bruised Rib: Care Instructions Overview You can get a bruised rib if you fall or get hit, such as in an accident or while playing sports. The medical term for a bruise is \"contusion. \" Small blood vessels get torn and leak blood under the skin. Most people think of a bruise as a black-and-blue area. But bones and muscles can also get bruised. An injury may damage the rib but not cause a bruise that you can see. Sometimes it can be hard to tell if a rib is bruised or broken. The symptoms may be the same. And a broken bone can't always be seen on an X-ray. But the treatment for a bruised rib is often the same as treatment for a broken one. An injury to the ribs can cause pain. The pain may be worse when you breathe deeply, cough, or sneeze. In most cases, a bruised rib will heal on its own. You can take pain medicine while the rib mends. Pain relief allows you to take deep breaths. Follow-up care is a key part of your treatment and safety. Be sure to make and go to all appointments, and call your doctor if you are having problems. It's also a good idea to know your test results and keep a list of the medicines you take. How can you care for yourself at home? · Rest and protect the injured or sore area. Stop, change, or take a break from any activity that causes pain. · Put ice or a cold pack on the area for 10 to 20 minutes at a time. Put a thin cloth between the ice and your skin. · After 2 or 3 days, if your swelling is gone, put a heating pad set on low or a warm cloth on your chest. Some doctors suggest that you go back and forth between hot and cold. Put a thin cloth between the heating pad and your skin. · Ask your doctor if you can take an over-the-counter pain medicine, such as acetaminophen (Tylenol), ibuprofen (Advil, Motrin), or naproxen (Aleve). Be safe with medicines. Read and follow all instructions on the label. · As your pain gets better, slowly return to your normal activities. Be patient. Rib bruises can take weeks or months to heal. If the pain gets worse, it may be a sign that you need to rest a while longer. When should you call for help? [id number] anytime you think you may need emergency care. For example, call if: 
  · You have severe trouble breathing.  
  Call your doctor now or seek immediate medical care if: 
  · You have trouble breathing.  
  · You have a fever.  
  · You have a new or worse cough.  
  · You have new or worse pain.  
 Watch closely for changes in your health, and be sure to contact your doctor if: 
  · You do not get better as expected. Where can you learn more? Go to http://melissa-khloe.info/. Enter R125 in the search box to learn more about \"Bruised Rib: Care Instructions. \" Current as of: April 19, 2018 Content Version: 11.8 © 5593-2370 [employer]. Care instructions adapted under license by [employer] (which disclaims liability or warranty for this information). If you have questions about a medical condition or this instruction, always ask your healthcare professional. [address] any warranty or liability for your use of this information.

## 2019-02-01 DIAGNOSIS — F51.01 PRIMARY INSOMNIA: ICD-10-CM

## 2019-02-01 NOTE — TELEPHONE ENCOUNTER
Patient called to request a refill on his Ativan 2 mg tablet      [facility] [address], [address] [facility] [address]  [facility] [phone]    PT Contact# [phone]

## 2019-02-02 RX ORDER — LORAZEPAM 2 MG/1
TABLET ORAL
Qty: 30 TAB | Refills: 2 | OUTPATIENT
Start: 2019-02-02

## 2019-04-15 ENCOUNTER — TELEPHONE (OUTPATIENT)
Dept: INTERNAL MEDICINE CLINIC | Age: 44
End: 2019-04-15

## 2019-04-15 NOTE — TELEPHONE ENCOUNTER
FYI: Patient requested to see PCP , was unable to offer an apt for this week. Patient states he believe he has vertigo ,, states he hears ringing in his ears and the room was spinning., Patient was informed PCP doesn't have an opening this week but offered an apt with [facility] , patient got upset and stated he needs a doctor but his doctor is too busy as always. Patient declined to have a msge sent to the nurse states he will call around and see what he can do . Declined to see Dr Xiang Whitt on Wednesday .

## 2019-04-17 ENCOUNTER — OFFICE VISIT (OUTPATIENT)
Dept: INTERNAL MEDICINE CLINIC | Age: 44
End: 2019-04-17

## 2019-04-17 VITALS
RESPIRATION RATE: 18 BRPM | HEIGHT: 73 IN | BODY MASS INDEX: 29.74 KG/M2 | OXYGEN SATURATION: 95 % | DIASTOLIC BLOOD PRESSURE: 83 MMHG | TEMPERATURE: 98.5 F | SYSTOLIC BLOOD PRESSURE: 145 MMHG | WEIGHT: 224.38 LBS | HEART RATE: 90 BPM

## 2019-04-17 DIAGNOSIS — H90.5 SENSORINEURAL HEARING LOSS (SNHL) OF RIGHT EAR, UNSPECIFIED HEARING STATUS ON CONTRALATERAL SIDE: ICD-10-CM

## 2019-04-17 DIAGNOSIS — R42 VERTIGO: Primary | ICD-10-CM

## 2019-04-17 DIAGNOSIS — H69.91 EUSTACHIAN TUBE DISORDER, RIGHT: ICD-10-CM

## 2019-04-17 DIAGNOSIS — E29.1 MALE HYPOGONADISM: ICD-10-CM

## 2019-04-17 RX ORDER — FLUTICASONE PROPIONATE 50 MCG
2 SPRAY, SUSPENSION (ML) NASAL DAILY
Qty: 1 BOTTLE | Refills: 3
Start: 2019-04-17 | End: 2020-04-09 | Stop reason: ALTCHOICE

## 2019-04-17 RX ORDER — MECLIZINE HYDROCHLORIDE 25 MG/1
25 TABLET ORAL
Qty: 30 TAB | Refills: 0 | Status: SHIPPED | OUTPATIENT
Start: 2019-04-17 | End: 2019-04-27

## 2019-04-17 NOTE — PATIENT INSTRUCTIONS
Dizziness: Care Instructions  Your Care Instructions  Dizziness is the feeling of unsteadiness or fuzziness in your head. It is different than having vertigo, which is a feeling that the room is spinning or that you are moving or falling. It is also different from lightheadedness, which is the feeling that you are about to faint. It can be hard to know what causes dizziness. Some people feel dizzy when they have migraine headaches. Sometimes bouts of flu can make you feel dizzy. Some medical conditions, such as heart problems or high blood pressure, can make you feel dizzy. Many medicines can cause dizziness, including medicines for high blood pressure, pain, or anxiety. If a medicine causes your symptoms, your doctor may recommend that you stop or change the medicine. If it is a problem with your heart, you may need medicine to help your heart work better. If there is no clear reason for your symptoms, your doctor may suggest watching and waiting for a while to see if the dizziness goes away on its own. Follow-up care is a key part of your treatment and safety. Be sure to make and go to all appointments, and call your doctor if you are having problems. It's also a good idea to know your test results and keep a list of the medicines you take. How can you care for yourself at home? · If your doctor recommends or prescribes medicine, take it exactly as directed. Call your doctor if you think you are having a problem with your medicine. · Do not drive while you feel dizzy. · Try to prevent falls. Steps you can take include:  ? Using nonskid mats, adding grab bars near the tub, and using night-lights. ? Clearing your home so that walkways are free of anything you might trip on.  ? Letting family and friends know that you have been feeling dizzy. This will help them know how to help you. When should you call for help? Call 911 anytime you think you may need emergency care.  For example, call if:    · You passed out (lost consciousness).     · You have dizziness along with symptoms of a heart attack. These may include:  ? Chest pain or pressure, or a strange feeling in the chest.  ? Sweating. ? Shortness of breath. ? Nausea or vomiting. ? Pain, pressure, or a strange feeling in the back, neck, jaw, or upper belly or in one or both shoulders or arms. ? Lightheadedness or sudden weakness. ? A fast or irregular heartbeat.     · You have symptoms of a stroke. These may include:  ? Sudden numbness, tingling, weakness, or loss of movement in your face, arm, or leg, especially on only one side of your body. ? Sudden vision changes. ? Sudden trouble speaking. ? Sudden confusion or trouble understanding simple statements. ? Sudden problems with walking or balance. ? A sudden, severe headache that is different from past headaches.    Call your doctor now or seek immediate medical care if:    · You feel dizzy and have a fever, headache, or ringing in your ears.     · You have new or increased nausea and vomiting.     · Your dizziness does not go away or comes back.    Watch closely for changes in your health, and be sure to contact your doctor if:    · You do not get better as expected. Where can you learn more? Go to http://melissa-khloe.info/. Enter Y927 in the search box to learn more about \"Dizziness: Care Instructions. \"  Current as of: September 23, 2018  Content Version: 11.9  © 0193-1907 [employer] Care instructions adapted under license by [employer] (which disclaims liability or warranty for this information). If you have questions about a medical condition or this instruction, always ask your healthcare professional. [place] [address] any warranty or liability for your use of this information. Vertigo: Exercises  Your Care Instructions  Here are some examples of typical rehabilitation exercises for your condition.  Start each exercise slowly. Ease off the exercise if you start to have pain. Your doctor or physical therapist will tell you when you can start these exercises and which ones will work best for you. How to do the exercises  Exercise 1    1. Stand with a chair in front of you and a wall behind you. If you begin to fall, you may use them for support. 2. Stand with your feet together and your arms at your sides. 3. Move your head up and down 10 times. Exercise 2    1. Move your head side to side 10 times. Exercise 3    1. Move your head diagonally up and down 10 times. Exercise 4    1. Move your head diagonally up and down 10 times on the other side. Follow-up care is a key part of your treatment and safety. Be sure to make and go to all appointments, and call your doctor if you are having problems. It's also a good idea to know your test results and keep a list of the medicines you take. Where can you learn more? Go to http://melissa-khloe.info/. Enter F349 in the search box to learn more about \"Vertigo: Exercises. \"  Current as of: March 27, 2018  Content Version: 11.9  © 6029-4825 [employer]. Care instructions adapted under license by [employer] (which disclaims liability or warranty for this information). If you have questions about a medical condition or this instruction, always ask your healthcare professional. [address] any warranty or liability for your use of this information.

## 2019-04-18 NOTE — PROGRESS NOTES
HISTORY OF PRESENT ILLNESS    Presents with episodes of vertigo for 3 day(s). Associated symptoms include: mild ear pressure, mild muffled hearing for 10 days. Hx of R SNHR   Treatments tried include: medication not used  S/s are stable. No n/v.   BP is borderline. Review of Systems   All other systems reviewed and are negative, except as noted in HPI    Past Medical and Surgical History   has a past medical history of Allergic rhinitis, Alopecia, male pattern, Asthma, Depression with anxiety, Eczema, GERD (gastroesophageal reflux disease), HSV-2 (herpes simplex virus 2) infection, Insomnia, Male hypogonadism, RLS (restless legs syndrome), and Sensorineural hearing loss (2013). has a past surgical history that includes hx colonoscopy (07/08/2013). reports that he has never smoked. He has never used smokeless tobacco. He reports that he does not drink alcohol or use drugs. family history is not on file. He was adopted. Physical Exam   Nursing note and vitals reviewed. Blood pressure 145/83, pulse 90, temperature 98.5 °F (36.9 °C), temperature source Oral, resp. rate 18, height 6' 1\" (1.854 m), weight 224 lb 6 oz (101.8 kg), SpO2 95 %. Constitutional: In no distress. Eyes: Conjunctivae are normal.  HEENT:  No LAD or thyromegaly   Cardiovascular: Normal rate. regular rhythm. No murmurs  No edema  Pulmonary/Chest: Effort normal. clear to ausculation blaterally  Musculoskeletal:  no edema. Abd:  Neurological: Alert and oriented. Grossly intact cranial nerves and motor function. Skin: No rash noted. Psychiatric: Normal mood and affect. Behavior is normal.     ASSESSMENT and PLAN  Diagnoses and all orders for this visit:    1. Vertigo  Mild, improving, prn meclizine  Return to clinic for further evaluation if new symptoms develop or if current symptoms worsen or fail to resolve.      2. Sensorineural hearing loss (SNHL) of right ear, unspecified hearing status on contralateral side  Chronic. F/u ENT    3. Eustachian tube disorder, right  -     fluticasone propionate (FLONASE) 50 mcg/actuation nasal spray; 2 Sprays by Both Nostrils route daily. 4. Male hypogonadism-not taking clomid. Take as rx and check labs 3 mo    Other orders  -     meclizine (ANTIVERT) 25 mg tablet; Take 1 Tab by mouth three (3) times daily as needed for Dizziness or Nausea for up to 10 days. Indications: sensation of spinning or whirling        lab results and schedule of future lab studies reviewed with patient  reviewed medications and side effects in detail    Return to clinic for further evaluation if new symptoms develop or if current symptoms worsen or fail to resolve. Patient Instructions          Dizziness: Care Instructions  Your Care Instructions  Dizziness is the feeling of unsteadiness or fuzziness in your head. It is different than having vertigo, which is a feeling that the room is spinning or that you are moving or falling. It is also different from lightheadedness, which is the feeling that you are about to faint. It can be hard to know what causes dizziness. Some people feel dizzy when they have migraine headaches. Sometimes bouts of flu can make you feel dizzy. Some medical conditions, such as heart problems or high blood pressure, can make you feel dizzy. Many medicines can cause dizziness, including medicines for high blood pressure, pain, or anxiety. If a medicine causes your symptoms, your doctor may recommend that you stop or change the medicine. If it is a problem with your heart, you may need medicine to help your heart work better. If there is no clear reason for your symptoms, your doctor may suggest watching and waiting for a while to see if the dizziness goes away on its own. Follow-up care is a key part of your treatment and safety. Be sure to make and go to all appointments, and call your doctor if you are having problems.  It's also a good idea to know your test results and keep a list of the medicines you take. How can you care for yourself at home? · If your doctor recommends or prescribes medicine, take it exactly as directed. Call your doctor if you think you are having a problem with your medicine. · Do not drive while you feel dizzy. · Try to prevent falls. Steps you can take include:  ? Using nonskid mats, adding grab bars near the tub, and using night-lights. ? Clearing your home so that walkways are free of anything you might trip on.  ? Letting family and friends know that you have been feeling dizzy. This will help them know how to help you. When should you call for help? Call 911 anytime you think you may need emergency care. For example, call if:    · You passed out (lost consciousness).     · You have dizziness along with symptoms of a heart attack. These may include:  ? Chest pain or pressure, or a strange feeling in the chest.  ? Sweating. ? Shortness of breath. ? Nausea or vomiting. ? Pain, pressure, or a strange feeling in the back, neck, jaw, or upper belly or in one or both shoulders or arms. ? Lightheadedness or sudden weakness. ? A fast or irregular heartbeat.     · You have symptoms of a stroke. These may include:  ? Sudden numbness, tingling, weakness, or loss of movement in your face, arm, or leg, especially on only one side of your body. ? Sudden vision changes. ? Sudden trouble speaking. ? Sudden confusion or trouble understanding simple statements. ? Sudden problems with walking or balance. ? A sudden, severe headache that is different from past headaches.    Call your doctor now or seek immediate medical care if:    · You feel dizzy and have a fever, headache, or ringing in your ears.     · You have new or increased nausea and vomiting.     · Your dizziness does not go away or comes back.    Watch closely for changes in your health, and be sure to contact your doctor if:    · You do not get better as expected.    Where can you learn more?  Go to http://melissalifeIO.info/. Enter Z666 in the search box to learn more about \"Dizziness: Care Instructions. \"  Current as of: September 23, 2018  Content Version: 11.9  © 2006-2018 [employer]. Care instructions adapted under license by [employer] (which disclaims liability or warranty for this information). If you have questions about a medical condition or this instruction, always ask your healthcare professional. [address] any warranty or liability for your use of this information. Vertigo: Exercises  Your Care Instructions  Here are some examples of typical rehabilitation exercises for your condition. Start each exercise slowly. Ease off the exercise if you start to have pain. Your doctor or physical therapist will tell you when you can start these exercises and which ones will work best for you. How to do the exercises  Exercise 1    1. Stand with a chair in front of you and a wall behind you. If you begin to fall, you may use them for support. 2. Stand with your feet together and your arms at your sides. 3. Move your head up and down 10 times. Exercise 2    1. Move your head side to side 10 times. Exercise 3    1. Move your head diagonally up and down 10 times. Exercise 4    1. Move your head diagonally up and down 10 times on the other side. Follow-up care is a key part of your treatment and safety. Be sure to make and go to all appointments, and call your doctor if you are having problems. It's also a good idea to know your test results and keep a list of the medicines you take. Where can you learn more? Go to http://melissaOutboxkhloe.info/. Enter F349 in the search box to learn more about \"Vertigo: Exercises. \"  Current as of: March 27, 2018  Content Version: 11.9  © 7524-3982 [employer].  Care instructions adapted under license by [employer] (which disclaims liability or warranty for this information). If you have questions about a medical condition or this instruction, always ask your healthcare professional. [place] [address] any warranty or liability for your use of this information.

## 2019-04-30 ENCOUNTER — TELEPHONE (OUTPATIENT)
Dept: INTERNAL MEDICINE CLINIC | Age: 44
End: 2019-04-30

## 2019-04-30 DIAGNOSIS — F41.8 DEPRESSION WITH ANXIETY: ICD-10-CM

## 2019-04-30 DIAGNOSIS — F51.01 PRIMARY INSOMNIA: ICD-10-CM

## 2019-04-30 RX ORDER — SERTRALINE HYDROCHLORIDE 100 MG/1
100 TABLET, FILM COATED ORAL DAILY
Qty: 90 TAB | Refills: 1 | Status: SHIPPED | OUTPATIENT
Start: 2019-04-30 | End: 2019-12-04 | Stop reason: SDUPTHER

## 2019-04-30 RX ORDER — LORAZEPAM 2 MG/1
TABLET ORAL
Qty: 30 TAB | Refills: 2 | Status: SHIPPED | OUTPATIENT
Start: 2019-04-30 | End: 2019-09-22 | Stop reason: SDUPTHER

## 2019-05-10 ENCOUNTER — OFFICE VISIT (OUTPATIENT)
Dept: INTERNAL MEDICINE CLINIC | Age: 44
End: 2019-05-10

## 2019-05-10 ENCOUNTER — TELEPHONE (OUTPATIENT)
Dept: INTERNAL MEDICINE CLINIC | Age: 44
End: 2019-05-10

## 2019-05-10 VITALS
TEMPERATURE: 98 F | SYSTOLIC BLOOD PRESSURE: 131 MMHG | HEIGHT: 73 IN | HEART RATE: 67 BPM | WEIGHT: 230.8 LBS | BODY MASS INDEX: 30.59 KG/M2 | OXYGEN SATURATION: 94 % | RESPIRATION RATE: 12 BRPM | DIASTOLIC BLOOD PRESSURE: 82 MMHG

## 2019-05-10 DIAGNOSIS — R42 VERTIGO: Primary | ICD-10-CM

## 2019-05-10 DIAGNOSIS — E29.1 MALE HYPOGONADISM: ICD-10-CM

## 2019-05-10 DIAGNOSIS — G47.9 SLEEP DISTURBANCES: ICD-10-CM

## 2019-05-10 DIAGNOSIS — F51.01 PRIMARY INSOMNIA: ICD-10-CM

## 2019-05-10 DIAGNOSIS — F41.8 DEPRESSION WITH ANXIETY: ICD-10-CM

## 2019-05-10 DIAGNOSIS — R53.82 CHRONIC FATIGUE: ICD-10-CM

## 2019-05-10 NOTE — TELEPHONE ENCOUNTER
I called pt to obtain more information regarding his sx. Pt was extremely rude on the phone. Call was transferred to Dr. David Manuel nurse to schedule an appt for him to be seen today by PCP.

## 2019-05-12 NOTE — PROGRESS NOTES
HISTORY OF PRESENT ILLNESS    Chief Complaint   Patient presents with    Dizziness       Presents for follow-up   He admits to being frustrated earlier today because he had an appointment scheduled with the nurse practitioner after I have convinced him that it was okay to see her as needed and then he was called to reschedule with me. Reports intermittent vertigo. Symptoms are mild today but sometimes can be moderate. Feels dizzy when he turns his head. No associated nausea or vomiting. Otherwise is feeling okay. He admits that he is still irritable and is frustrated by that. Chronic fatigue. He saw neurology in 2015 and had a sleep study which showed restless leg syndrome. Says that he snores heavily and has girlfriend says that he stopped breathing at night. Asked to be tested for sleep apnea again if possible. Is frustrated by being tired all the time. Taking Zoloft 100 mg daily for anxiety. Feels it may help somewhat, but may be causing some fatigue. Taking lorazepam 2 mg at bedtime as needed for sleep and anxiety as well. Hypogonadism. He has been taking Clomid 50 mg 3 times a week now for the past month. He would like labs done. Review of Systems   All other systems reviewed and are negative, except as noted in HPI    Past Medical and Surgical History   has a past medical history of Allergic rhinitis, Alopecia, male pattern, Asthma, Depression with anxiety, Eczema, GERD (gastroesophageal reflux disease), HSV-2 (herpes simplex virus 2) infection, Insomnia, Male hypogonadism, RLS (restless legs syndrome), and Sensorineural hearing loss (2013). has a past surgical history that includes hx colonoscopy (07/08/2013). reports that he has never smoked. He has never used smokeless tobacco. He reports that he does not drink alcohol or use drugs. family history is not on file. He was adopted. Physical Exam   Nursing note and vitals reviewed.   Blood pressure 131/82, pulse 67, temperature 98 °F (36.7 °C), temperature source Oral, resp. rate 12, height 6' 1\" (1.854 m), weight 230 lb 12.8 oz (104.7 kg), SpO2 94 %. Constitutional:  No distress. Eyes: Conjunctivae are normal.   Ears:  Hearing grossly intact  Cardiovascular: Normal rate. regular rhythm, no murmurs or gallops  No edema  Pulmonary/Chest: Effort normal.   CTAB  Musculoskeletal: moves all 4 extremities   Neurological: Alert and oriented to person, place, and time. Skin: No rash noted. Psychiatric: Normal mood and affect. Behavior is normal.     ASSESSMENT and PLAN  Diagnoses and all orders for this visit:    1. Vertigo   Intermittent vertigo symptoms. Improved now. Can consider referral to ENT or physical therapy if symptoms are worsening. Declines for now. 2. Male hypogonadism  Taking Clomid for 1 month. May need more time to fully equilibrate. Some of his physical and mental issues may be related to this condition being undertreated at this time. -     TESTOSTERONE, FREE+TOTAL    3. Depression with anxiety  Chronic irritability, anger management issues, fatigue. Her medications are not totally controlling his condition, but I think we need to optimize testosterone levels and work on sleep evaluation prior to further adjustments. Could consider decreasing Zoloft to 50 mg again if fatigue really seems to be a side effect of this. 4. Primary insomnia strongly recommend an EP evaluation for sleep apnea. 5. Sleep disturbances  Treating low testosterone potentially could increase risk of sleep apnea so we need to evaluate this further.  -     SLEEP MEDICINE REFERRAL    6.  Chronic fatigue  -     SLEEP MEDICINE REFERRAL  -     TSH 3RD GENERATION  -     TESTOSTERONE, FREE+TOTAL  -     METABOLIC PANEL, COMPREHENSIVE  -     CBC W/O DIFF        There are no Patient Instructions on file for this visit.    lab results and schedule of future lab studies reviewed with patient  reviewed medications and side effects in detail    Return to clinic for further evaluation if new symptoms develop        Current Outpatient Medications   Medication Sig    LORazepam (ATIVAN) 2 mg tablet TAKE 1 TABLET DAILY AS NEEDED FOR ANXIETY  Indications: anxious    sertraline (ZOLOFT) 100 mg tablet Take 1 Tab by mouth daily.  fluticasone propionate (FLONASE) 50 mcg/actuation nasal spray 2 Sprays by Both Nostrils route daily.  clomiPHENE (CLOMID) 50 mg tablet Three times weekly.  triamcinolone acetonide (KENALOG) 0.1 % topical cream Apply  to affected area two (2) times a day. No current facility-administered medications for this visit.

## 2019-05-14 LAB
ALBUMIN SERPL-MCNC: 4.5 G/DL (ref 3.5–5.5)
ALBUMIN/GLOB SERPL: 1.8 {RATIO} (ref 1.2–2.2)
ALP SERPL-CCNC: 65 IU/L (ref 39–117)
ALT SERPL-CCNC: 14 IU/L (ref 0–44)
AST SERPL-CCNC: 19 IU/L (ref 0–40)
BILIRUB SERPL-MCNC: 0.2 MG/DL (ref 0–1.2)
BUN SERPL-MCNC: 14 MG/DL (ref 6–24)
BUN/CREAT SERPL: 13 (ref 9–20)
CALCIUM SERPL-MCNC: 9.5 MG/DL (ref 8.7–10.2)
CHLORIDE SERPL-SCNC: 103 MMOL/L (ref 96–106)
CO2 SERPL-SCNC: 26 MMOL/L (ref 20–29)
CREAT SERPL-MCNC: 1.11 MG/DL (ref 0.76–1.27)
ERYTHROCYTE [DISTWIDTH] IN BLOOD BY AUTOMATED COUNT: 14.4 % (ref 12.3–15.4)
GLOBULIN SER CALC-MCNC: 2.5 G/DL (ref 1.5–4.5)
GLUCOSE SERPL-MCNC: 89 MG/DL (ref 65–99)
HCT VFR BLD AUTO: 41.4 % (ref 37.5–51)
HGB BLD-MCNC: 13.6 G/DL (ref 13–17.7)
MCH RBC QN AUTO: 28.7 PG (ref 26.6–33)
MCHC RBC AUTO-ENTMCNC: 32.9 G/DL (ref 31.5–35.7)
MCV RBC AUTO: 87 FL (ref 79–97)
PLATELET # BLD AUTO: 316 X10E3/UL (ref 150–379)
POTASSIUM SERPL-SCNC: 4.3 MMOL/L (ref 3.5–5.2)
PROT SERPL-MCNC: 7 G/DL (ref 6–8.5)
RBC # BLD AUTO: 4.74 X10E6/UL (ref 4.14–5.8)
SODIUM SERPL-SCNC: 142 MMOL/L (ref 134–144)
TESTOST FREE SERPL-MCNC: 26.9 PG/ML (ref 6.8–21.5)
TESTOST SERPL-MCNC: 562.4 NG/DL (ref 264–916)
TSH SERPL DL<=0.005 MIU/L-ACNC: 2.74 UIU/ML (ref 0.45–4.5)
WBC # BLD AUTO: 9.1 X10E3/UL (ref 3.4–10.8)

## 2019-05-29 ENCOUNTER — OFFICE VISIT (OUTPATIENT)
Dept: SLEEP MEDICINE | Age: 44
End: 2019-05-29

## 2019-05-29 VITALS
SYSTOLIC BLOOD PRESSURE: 122 MMHG | HEART RATE: 71 BPM | HEIGHT: 72 IN | DIASTOLIC BLOOD PRESSURE: 69 MMHG | BODY MASS INDEX: 31.02 KG/M2 | OXYGEN SATURATION: 96 % | WEIGHT: 229 LBS

## 2019-05-29 DIAGNOSIS — G47.61 PLMD (PERIODIC LIMB MOVEMENT DISORDER): ICD-10-CM

## 2019-05-29 DIAGNOSIS — G47.33 OSA (OBSTRUCTIVE SLEEP APNEA): Primary | ICD-10-CM

## 2019-05-29 NOTE — PROGRESS NOTES
[address] [facility]. [place], [address]  Tel.  [phone]  Fax. 100 [facility] 60  [place], [address] [facility]  Tel.  [phone]  Fax. [phone] [address]  Tel.  [phone]  Fax. [phone]       Chief Complaint       Chief Complaint   Patient presents with    Sleep Problem     NP; ref Dr Dane Richards; excessive daytime fatigue, restless legs;       HPI      Isaac Lott is 37 y.o. male seen for evaluation of a sleep disorder. He has a history of nocturnal leg movements stating that his \"legs jump and body twitches\". He was evaluated for a number of years ago by Dr. Montse Harris having had a sleep study. He was told that he had restless leg syndrome. He was initially started on Mirapex which he reported causing imbalance. He was then changed to ropinirole 3 times daily which he \"never took that frequently\". Neither medication apparently reduce the periodic leg movement. He has been on lorazepam chronically which he states he \"cannot stop\". Ferritin level has been previously obtained; the most recent in July 2018 was 81 ng/mL. He notes he has gained approximately 20 pounds or more since his initial sleep evaluation. He has been told of snoring as well as apparent apnea. He states that he will often have a large meal before retiring at midnight; he will awaken between 6-7 AM.  He is fatigued on awakening and states that he could \"sleep for another few hours\". He notes that he is fatigued during the day. He may nap which makes fatigue more prominent. The patient has not undergone recent diagnostic testing for the current problems.        [place] Sleepiness Score: 6       Allergies   Allergen Reactions    Bactrim [Sulfamethoprim Ds] Other (comments)       Current Outpatient Medications   Medication Sig Dispense Refill    LORazepam (ATIVAN) 2 mg tablet TAKE 1 TABLET DAILY AS NEEDED FOR ANXIETY  Indications: anxious 30 Tab 2    sertraline (ZOLOFT) 100 mg tablet Take 1 Tab by mouth daily. 90 Tab 1    fluticasone propionate (FLONASE) 50 mcg/actuation nasal spray 2 Sprays by Both Nostrils route daily. 1 Bottle 3    clomiPHENE (CLOMID) 50 mg tablet Three times weekly. 45 Tab 5    triamcinolone acetonide (KENALOG) 0.1 % topical cream Apply  to affected area two (2) times a day. 30 g 2        He  has a past medical history of Allergic rhinitis, Alopecia, male pattern, Asthma, Depression with anxiety, Eczema, GERD (gastroesophageal reflux disease), HSV-2 (herpes simplex virus 2) infection, Insomnia, Male hypogonadism, Psychiatric disorder, RLS (restless legs syndrome), and Sensorineural hearing loss (2013). He  has a past surgical history that includes hx colonoscopy (07/08/2013). He family history is not on file. He was adopted. He  reports that he has never smoked. He has never used smokeless tobacco. He reports that he does not drink alcohol or use drugs. Review of Systems:  Review of Systems   Constitutional: Negative for chills and fever. HENT: Negative for hearing loss and tinnitus. Eyes: Negative for blurred vision and double vision. Respiratory: Negative for cough and shortness of breath. Cardiovascular: Negative for chest pain and palpitations. Gastrointestinal: Negative for abdominal pain and heartburn. Genitourinary: Negative for frequency and urgency. Musculoskeletal: Negative for back pain and neck pain. Skin: Negative for itching and rash. Neurological: Positive for dizziness and headaches. Psychiatric/Behavioral: Negative for depression. Objective:     Visit Vitals  /69   Pulse 71   Ht 6' (1.829 m)   Wt 229 lb (103.9 kg)   SpO2 96%   BMI 31.06 kg/m²     Body mass index is 31.06 kg/m².     General:   Conversant, cooperative   Eyes:  Pupils equal and reactive, no nystagmus   Oropharynx:   Mallampati score III, tongue normal   Tonsils:     Neck:   No carotid bruits; Neck circ. in \"inches\": 15.25   Chest/Lungs:  R base wheezing   CVS:  Normal rate, regular rhythm   Skin:  Warm to touch; no obvious rashes   Neuro:  Speech fluent, face symmetrical, tongue movement normal   Psych:  Normal affect,  normal countenance        Assessment:       ICD-10-CM ICD-9-CM    1. MARISSA (obstructive sleep apnea) G47.33 327.23 SPLIT CPAP/PSG   2. PLMD (periodic limb movement disorder) G47.61 327.51 SPLIT CPAP/PSG     Potential sleep disordered breathing with snoring, nonrestorative sleep and daytime fatigue. His history is consistent with periodic leg movement disorder. Dopaminergic medications have not been effective. He is currently taking lorazepam; often clonazepam is a more effective medication. Other treatment options would include gabapentin or gabapentin agonist (i.e. Baclofen). He will be reevaluated with a nocturnal polysomnogram.  Results will be reviewed with him. Plan:     Orders Placed This Encounter    SPLIT CPAP/PSG     Standing Status:   Future     Standing Expiration Date:   11/29/2019     Order Specific Question:   Reason for Exam     Answer:   snoring, fatigue, nocturnal leg movements       * Patient has a history and examination consistent with the diagnosis of sleep apnea. * Sleep testing was ordered for reevaluation. * He was provided information on sleep apnea including corresponding risk factors and the importance of proper treatment. * Treatment options if indicated were reviewed today. Instructions:  o The patient would benefit from weight reduction measures. o Do not engage in activities requiring a normal degree of alertness if fatigue is present.   o The patient understands that untreated or undertreated sleep apnea could impair judgement and the ability to function normally during the day.  o Call or return if symptoms worsen or persist.          Mauro Pro MD, FAASM  Electronically signed 05/29/19       This note was created using voice recognition software. Despite editing, there may be syntax errors. This note will not be viewable in [address]

## 2019-05-29 NOTE — PATIENT INSTRUCTIONS

## 2019-06-13 ENCOUNTER — TELEPHONE (OUTPATIENT)
Dept: INTERNAL MEDICINE CLINIC | Age: 44
End: 2019-06-13

## 2019-06-13 NOTE — TELEPHONE ENCOUNTER
Patient is requesting a script for Amoxicillin, states he was a sinus infection and is trying to  Catch it before it reaches his lungs.  He can be reached at [phone]

## 2019-06-13 NOTE — TELEPHONE ENCOUNTER
I spoke with patient to advise he needs to be seen for evaluation in the office or I can call dispatch [facility] to come see patient today for evaluation of his sx. [facility] I called  to give them pts information. They will call pt now to confirm treatment and appt time.

## 2019-07-23 ENCOUNTER — TELEPHONE (OUTPATIENT)
Dept: SLEEP MEDICINE | Age: 44
End: 2019-07-23

## 2019-07-23 DIAGNOSIS — G47.33 OSA (OBSTRUCTIVE SLEEP APNEA): Primary | ICD-10-CM

## 2019-07-23 NOTE — TELEPHONE ENCOUNTER
Per Authorization Department:      Per aim the criteria does not meet in lab qualifications for 58376 (Split Night), therefore it is pending in peer to peer review, unless the physician would like to approve for a home sleep study, physician's office can call [phone], reference patients name, , insurance id number, before the closing date of 2019.     Please advise if you would like to do P2P or Home sleep Test

## 2019-07-30 ENCOUNTER — TELEPHONE (OUTPATIENT)
Dept: INTERNAL MEDICINE CLINIC | Age: 44
End: 2019-07-30

## 2019-07-30 NOTE — TELEPHONE ENCOUNTER
----- Message from Janet Gutiérrez sent at 7/30/2019  1:33 PM EDT -----  Regarding: Dr. Petra Calles   Pt is requesting a prior authorization for Rx LORazepam (ATIVAN) 2 mg tablet [[id number]] that is going to the pharmacy on file which is [facility] [place], [place] - [address]  [facility] [address] . Best contact number is [phone].

## 2019-08-09 ENCOUNTER — TELEPHONE (OUTPATIENT)
Dept: SLEEP MEDICINE | Age: 44
End: 2019-08-09

## 2019-08-09 NOTE — TELEPHONE ENCOUNTER
HSAT Returned-[facility]    Date of Study: 7/25/19    The following information was gathered from the patients study log:    Further information provided: Log scanned into media    Poor flow at times during study

## 2019-08-13 NOTE — TELEPHONE ENCOUNTER
HSAT demonstrated mild sleep disordered breathing characterized by an overall AHI of 5.3 per hour. Minimal SaO2 87%. Snoring during 12.4% of the study . Events primarily supine with the supine-related AHI of 14.6 per hour. Impression: supine related sleep disordered breathing. Patient may benefit from measures to limit supine sleep ( e.g. Slumber bump or similar) or oral appliance. Chief technologist: please review study with patient. Will refer for oral appliance if patient prefers.

## 2019-08-14 NOTE — TELEPHONE ENCOUNTER
Reviewed sleep study results with patient. He expressed understanding and for now plans to work on reducing Supine sleep. He will think over the oral appliance option and discuss with family. If he decides to seek treatment with an oral appliance he will call us at a later date. No further action required.

## 2019-09-22 DIAGNOSIS — F51.01 PRIMARY INSOMNIA: ICD-10-CM

## 2019-09-23 RX ORDER — LORAZEPAM 2 MG/1
TABLET ORAL
Qty: 30 TAB | Refills: 0 | Status: SHIPPED | OUTPATIENT
Start: 2019-09-23 | End: 2019-11-16 | Stop reason: SDUPTHER

## 2019-09-23 NOTE — TELEPHONE ENCOUNTER
[facility] Front Office Pool         Pt request for a refill for his medication \"Lorazapan\" 2mg sent to his [facility]	[place] on file. Pt is completely out.  Best contact number is [phone]

## 2019-10-08 ENCOUNTER — OFFICE VISIT (OUTPATIENT)
Dept: INTERNAL MEDICINE CLINIC | Age: 44
End: 2019-10-08

## 2019-10-08 VITALS
RESPIRATION RATE: 18 BRPM | BODY MASS INDEX: 29.39 KG/M2 | HEART RATE: 84 BPM | TEMPERATURE: 98 F | OXYGEN SATURATION: 92 % | DIASTOLIC BLOOD PRESSURE: 84 MMHG | WEIGHT: 217 LBS | HEIGHT: 72 IN | SYSTOLIC BLOOD PRESSURE: 119 MMHG

## 2019-10-08 DIAGNOSIS — J01.91 ACUTE RECURRENT SINUSITIS, UNSPECIFIED LOCATION: Primary | ICD-10-CM

## 2019-10-08 DIAGNOSIS — J20.9 ACUTE BRONCHITIS, UNSPECIFIED ORGANISM: ICD-10-CM

## 2019-10-08 DIAGNOSIS — J45.21 MILD INTERMITTENT ASTHMA WITH EXACERBATION: ICD-10-CM

## 2019-10-08 RX ORDER — ALBUTEROL SULFATE 90 UG/1
2 AEROSOL, METERED RESPIRATORY (INHALATION)
Qty: 1 INHALER | Refills: 0 | Status: SHIPPED | OUTPATIENT
Start: 2019-10-08 | End: 2020-04-09 | Stop reason: ALTCHOICE

## 2019-10-08 RX ORDER — PREDNISONE 20 MG/1
TABLET ORAL
Qty: 18 TAB | Refills: 0 | Status: SHIPPED | OUTPATIENT
Start: 2019-10-08 | End: 2019-10-17

## 2019-10-08 RX ORDER — DOXYCYCLINE HYCLATE 100 MG
100 TABLET ORAL 2 TIMES DAILY
Qty: 14 TAB | Refills: 0 | Status: SHIPPED | OUTPATIENT
Start: 2019-10-08 | End: 2019-10-15

## 2019-10-08 NOTE — PROGRESS NOTES
Subjective:   Presents with nasal blockage, post nasal drip and bilateral sinus pain for 4 days. Denies shortness of breath, chest pain, abdominal pain, nausea, vomiting,  fever and chills. Symptoms are moderate. Recent treatment for similar symptoms? None   Has tried over-the-counter remedies with partial relief of symptoms. Contacts with similar infections: no. Asthma?:  yes. reports that he has never smoked. He has never used smokeless tobacco.    Review of Systems  Pertinent items are noted in HPI. Objective:   Blood pressure 119/84, pulse 84, temperature 98 °F (36.7 °C), temperature source Oral, resp. rate 18, height 6' (1.829 m), weight 217 lb (98.4 kg), SpO2 92 %. General:  alert, cooperative, no distress   Eyes: conjunctivae/corneas clear. Ears: normal TM's and external ear canals AU   Sinuses: paranasal sinuses with mild tenderness, Patient has indurated and edematous nasal tubinates   Mouth:  normal findings: palate normal, tongue midline and normal and oropharynx pink & moist with erythema, but no exudates, ulcers or evidence of thrush   Neck: supple, symmetrical, trachea midline and no adenopathy. Heart: S1 and S2 normal, no murmurs noted. Lungs: Mild wheezing bilaterally   Abdomen: soft, non-tender. Bowel sounds normal. No masses,  no organomegaly        Assessment/Plan:   Acute sinusitis   Mild asthma exacerbation  Patient has failed  conservative therapy  Orders Placed This Encounter    predniSONE (DELTASONE) 20 mg tablet    doxycycline (VIBRA-TABS) 100 mg tablet    albuterol (PROVENTIL HFA, VENTOLIN HFA, PROAIR HFA) 90 mcg/actuation inhaler       - Seek medical care if symptoms become more severe or if you develop chest pain, shortness of breath, confusion. Contact us if your symptoms fail to improve after 7-10 days Rest as much as possible and stay home from work/school at least 24 hours   after last fever.    Wash hand frequently and cough/sneeze into your sleeve to help prevent infection of others. Drink plenty of fluids  - Ibuprofen (Advil, Motrin) 400-800mg every 6 hours or Aleve 220 mg 1-2 pills every 8 hours for fever, headache, pain  - Tylenol extra strength 500 mg every 6 hours for pain, headache, fever  - Nasal saline rinses 2-3 times daily for nasal congestion  - Benadryl (diphenhydramine) 50 mg at night for nasal congestion/allergies  - Pseudophedrine 12-hour tablets twice daily for nasal and inner ear congestion.    - Afrin (oxymetazoline) nasal spray 2 sprays in each nostril twice daily for severe      congestion. Do not use this medication for more than 3 days as it may cause         \"rebound congestion\".

## 2019-11-16 DIAGNOSIS — F51.01 PRIMARY INSOMNIA: ICD-10-CM

## 2019-11-18 RX ORDER — LORAZEPAM 2 MG/1
TABLET ORAL
Qty: 30 TAB | Refills: 0 | Status: SHIPPED | OUTPATIENT
Start: 2019-11-18 | End: 2019-12-26

## 2019-11-18 NOTE — TELEPHONE ENCOUNTER
[facility]   Phone Number: [phone]             Caller (if not patient): n/a   Relationship of caller (if not patient):n/a   Best contact number(s): [phone]   Name of medication and dosage if known: Lorazepam 2 mg   Is patient out of this medication (yes/no):yes   Pharmacy name: in chart   Pharmacy listed in chart? (yes/no):yes   Pharmacy phone number: in chart   Date of last visit: 10/08/19   Details to clarify the request: n/a

## 2019-11-20 ENCOUNTER — TELEPHONE (OUTPATIENT)
Dept: SLEEP MEDICINE | Age: 44
End: 2019-11-20

## 2019-11-20 NOTE — TELEPHONE ENCOUNTER
Patient left message stating he would like a call back per had questions in regards to his sleep study test

## 2019-12-03 DIAGNOSIS — F41.8 DEPRESSION WITH ANXIETY: ICD-10-CM

## 2019-12-03 NOTE — TELEPHONE ENCOUNTER
----- Message from Earl Reynolds sent at 12/3/2019  2:56 PM EST -----  Regarding: Port/ Refill  Contact: [phone]  Caller (if not patient): n/a  Relationship of caller (if not patient): n/a  Best contact number(s): [phone]   Name of medication and dosage if known: Sertraline  Is patient out of this medication (yes/no): Yes   Pharmacy name: [facility] listed in chart? (yes/no):  Yes  Pharmacy phone number:  Date of last visit: Tuesday, October 08, 2019 10:40 AM

## 2019-12-06 RX ORDER — SERTRALINE HYDROCHLORIDE 100 MG/1
100 TABLET, FILM COATED ORAL DAILY
Qty: 90 TAB | Refills: 1 | Status: SHIPPED | OUTPATIENT
Start: 2019-12-06 | End: 2020-04-09 | Stop reason: ALTCHOICE

## 2019-12-26 DIAGNOSIS — F51.01 PRIMARY INSOMNIA: ICD-10-CM

## 2019-12-26 RX ORDER — LORAZEPAM 2 MG/1
TABLET ORAL
Qty: 30 TAB | Refills: 1 | Status: SHIPPED | OUTPATIENT
Start: 2019-12-26 | End: 2020-03-31 | Stop reason: SDUPTHER

## 2020-03-31 DIAGNOSIS — F51.01 PRIMARY INSOMNIA: ICD-10-CM

## 2020-03-31 RX ORDER — LORAZEPAM 2 MG/1
TABLET ORAL
Qty: 30 TAB | Refills: 1 | Status: SHIPPED | OUTPATIENT
Start: 2020-03-31 | End: 2020-07-07

## 2020-04-09 ENCOUNTER — VIRTUAL VISIT (OUTPATIENT)
Dept: INTERNAL MEDICINE CLINIC | Age: 45
End: 2020-04-09

## 2020-04-09 VITALS — WEIGHT: 217 LBS | BODY MASS INDEX: 29.43 KG/M2

## 2020-04-09 DIAGNOSIS — E29.1 MALE HYPOGONADISM: ICD-10-CM

## 2020-04-09 DIAGNOSIS — R45.4 IRRITABILITY: ICD-10-CM

## 2020-04-09 DIAGNOSIS — F51.01 PRIMARY INSOMNIA: ICD-10-CM

## 2020-04-09 DIAGNOSIS — F41.8 DEPRESSION WITH ANXIETY: Primary | ICD-10-CM

## 2020-04-09 RX ORDER — ESCITALOPRAM OXALATE 10 MG/1
10 TABLET ORAL DAILY
Qty: 30 TAB | Refills: 2 | Status: SHIPPED | OUTPATIENT
Start: 2020-04-09 | End: 2020-11-17 | Stop reason: ALTCHOICE

## 2020-04-09 RX ORDER — CLOMIPHENE CITRATE 50 MG/1
TABLET ORAL
Qty: 45 TAB | Refills: 3 | Status: SHIPPED | OUTPATIENT
Start: 2020-04-09 | End: 2020-11-17 | Stop reason: ALTCHOICE

## 2020-04-09 NOTE — PROGRESS NOTES
Pt very angry about everything ,angry he had to waiting or the call, angry he stopped medicine on his own (Zoloft)

## 2020-04-09 NOTE — PROGRESS NOTES
Consent: Merrill Estrada, who was seen by synchronous (real-time) audio-video technology, and/or his healthcare decision maker, is aware that this patient-initiated, Telehealth encounter on 4/9/2020 is a billable service, with coverage as determined by his insurance carrier. He is aware that he may receive a bill and has provided verbal consent to proceed: YES  712  Subjective:   Merrill Estrada is a 53 y.o. male who was seen for Medication Evaluation (stopped Zoloft --on his own 1 month )      Follow-up of anxiety. Mild depression. Says that he has been increasingly irritable over the past couple of months. He decided also to stop his Zoloft last month because he was not sure if it is helping. Says that he feels irritable, angry, difficult to calm down at times. He feels mildly depressed, but denies any sadness. Says that he is worried somewhat about his ex wife's child who is been dealing with substance abuse. He feels like he thinks he would feel he was withdrawing from medications, but this was prior to stopping Zoloft. Denies any other substance abuse. Has been taking Lorazepam sparingly. Last refilled 30 pills March 31. Previously filled 30 pills December 26.  reviewed and no prescriptions from anybody else other than me. Has any alcohol use. He is interested in trying a new medication and has heard about Lexapro. Would just like to feel little bit more level. Hypogonadism. Started Clomid early 2019 and testosterone levels were in the 500s on medication. He also stopped Clomid a couple of months ago since he ran out. Feels this may be exacerbating his symptoms as well. Says that his breasts have been feeling a little bit tender. Current Outpatient Medications   Medication Sig    escitalopram oxalate (LEXAPRO) 10 mg tablet Take 1 Tab by mouth daily.  clomiPHENE (CLOMID) 50 mg tablet Three times weekly.     LORazepam (ATIVAN) 2 mg tablet TAKE 1 TABLET BY MOUTH EVERY DAY AS NEEDED FOR ANXIETY     No current facility-administered medications for this visit. Allergies   Allergen Reactions    Bactrim [Sulfamethoprim Ds] Other (comments)       Past Medical History:   Diagnosis Date    Allergic rhinitis     Alopecia, male pattern     Asthma     mild intermittent    Depression with anxiety     took wellbutrin, zoloft. anger management issues    Eczema     GERD (gastroesophageal reflux disease)     HSV-2 (herpes simplex virus 2) infection     Insomnia     Male hypogonadism     took clomid 2016    Psychiatric disorder     depression, anxiety    RLS (restless legs syndrome)     saw Dr. Gilmer Aguilar 8/19/15. failed mirapex    Sensorineural hearing loss 2013    right.   Dr. Ofelia BAUER  All other systems reviewed and negative, unless mentioned in HPI    Objective:   Vital Signs: (As obtained by patient/caregiver at home)  Visit Vitals  Wt 217 lb (98.4 kg)   BMI 29.43 kg/m²        [INSTRUCTIONS:  \"[x]\" Indicates a positive item  \"[]\" Indicates a negative item  -- DELETE ALL ITEMS NOT EXAMINED]    Constitutional: [x] Appears well-developed and well-nourished [x] No apparent distress      [] Abnormal -     Mental status: [x] Alert and awake  [x] Oriented to person/place/time [x] Able to follow commands    [] Abnormal -     Eyes:   EOM    [x]  Normal    [] Abnormal -   Sclera  [x]  Normal    [] Abnormal -          Discharge [x]  None visible   [] Abnormal -     HENT: [x] Normocephalic, atraumatic  [] Abnormal -   [x] Mouth/Throat: Mucous membranes are moist    External Ears [x] Normal  [] Abnormal -    Neck: [x] No visualized mass [] Abnormal -     Pulmonary/Chest: [x] Respiratory effort normal   [x] No visualized signs of difficulty breathing or respiratory distress        [] Abnormal -      Musculoskeletal:   [x] Normal gait with no signs of ataxia         [x] Normal range of motion of neck        [] Abnormal -     Neurological:        [x] No Facial Asymmetry (Cranial nerve 7 motor function) (limited exam due to video visit)          [x] No gaze palsy        [] Abnormal -          Skin:        [x] No significant exanthematous lesions or discoloration noted on facial skin         [] Abnormal -            Psychiatric:       [x] Normal Affect [] Abnormal -        [x] No Hallucinations    Other pertinent observable physical exam findings:-        Assessment & Plan:   Diagnoses and all orders for this visit:    1. Depression with anxiety  2. Primary insomnia  3. Irritability  Irritability, anxiety, mild dysthymia. Multifactorial.  Hypogonadism may contribute. Stress about his wife's child. Cannot rule out mood disorder, personality disorder, bipolar 1. Consider mood stabilizer or psychiatric consult. That said, he did somewhat better with Zoloft. I think it would be reasonable to try Lexapro. Educated on side effects of fatigue. Decreased to 1/2 pill if intolerable. Continue to use Lorazepam sparingly.  reviewed. With all alcohol and drug use. He voices understanding. Follow-up in 2 to 3 months. -     escitalopram oxalate (LEXAPRO) 10 mg tablet; Take 1 Tab by mouth daily. 4. Male hypogonadism  Unclear what degree his symptoms are related to this. That said, I think having his testosterone level in a reasonable range is necessary. Resume Clomid. Repeat labs in 2 3 months. Breast tenderness may be related hypogonadism.  -     clomiPHENE (CLOMID) 50 mg tablet; Three times weekly. We discussed the expected course, resolution and complications of the diagnosis(es) in detail. Medication risks, benefits, costs, interactions, and alternatives were discussed as indicated. I advised him to contact the office if his condition worsens, changes or fails to improve as anticipated. He expressed understanding with the diagnosis(es) and plan. Yovana De Luna is a 53 y.o. male being evaluated by a video visit encounter for concerns as above.   A caregiver was present when appropriate. Due to this being a TeleHealth encounter (During [place] public health emergency), evaluation of the following organ systems was limited: Vitals/Constitutional/EENT/Resp/CV/GI//MS/Neuro/Skin/Heme-Lymph-Imm. Pursuant to the emergency declaration under the [address] [place], [place] [address] waiver authority and the [employer] Act, this Virtual  Visit was conducted, with patient's (and/or legal guardian's) consent, to reduce the patient's risk of exposure to COVID-19 and provide necessary medical care. Services were provided through a video synchronous discussion virtually to substitute for in-person clinic visit. Patient and provider were located at their individual homes.     Geraldine Villa MD

## 2020-07-07 DIAGNOSIS — F51.01 PRIMARY INSOMNIA: ICD-10-CM

## 2020-07-07 RX ORDER — LORAZEPAM 2 MG/1
TABLET ORAL
Qty: 30 TAB | Refills: 2 | Status: SHIPPED | OUTPATIENT
Start: 2020-07-07 | End: 2020-11-17 | Stop reason: SDUPTHER

## 2020-07-07 NOTE — TELEPHONE ENCOUNTER
profile was accessed online for [employer] and reviewed by me during this encounter. I did not see evidence of inappropriate or suspicious controlled substance prescription activity.  
Prescription sent to pharmacy, per request

## 2020-11-17 ENCOUNTER — VIRTUAL VISIT (OUTPATIENT)
Dept: INTERNAL MEDICINE CLINIC | Age: 45
End: 2020-11-17
Payer: COMMERCIAL

## 2020-11-17 DIAGNOSIS — F51.01 PRIMARY INSOMNIA: ICD-10-CM

## 2020-11-17 DIAGNOSIS — F41.8 DEPRESSION WITH ANXIETY: ICD-10-CM

## 2020-11-17 DIAGNOSIS — Z00.00 PREVENTATIVE HEALTH CARE: ICD-10-CM

## 2020-11-17 DIAGNOSIS — E29.1 MALE HYPOGONADISM: Primary | ICD-10-CM

## 2020-11-17 PROCEDURE — 99213 OFFICE O/P EST LOW 20 MIN: CPT | Performed by: INTERNAL MEDICINE

## 2020-11-17 RX ORDER — LORAZEPAM 2 MG/1
2 TABLET ORAL
Qty: 30 TAB | Refills: 2 | Status: SHIPPED | OUTPATIENT
Start: 2020-11-17 | End: 2021-03-01

## 2020-11-17 RX ORDER — VENLAFAXINE HYDROCHLORIDE 37.5 MG/1
37.5 CAPSULE, EXTENDED RELEASE ORAL DAILY
Qty: 30 CAP | Refills: 0 | Status: SHIPPED | OUTPATIENT
Start: 2020-11-17 | End: 2021-01-07

## 2020-11-17 NOTE — PROGRESS NOTES
Consent: Elly Beaver, who was seen by synchronous (real-time) audio-video technology, and/or his healthcare decision maker, is aware that this patient-initiated, Telehealth encounter on 11/17/2020 is a billable service, with coverage as determined by his insurance carrier. He is aware that he may receive a bill and has provided verbal consent to proceed: YES  712  Subjective:   Elly Beaver is a 50 y.o. male who was seen for Medication Evaluation    He would like labs done. His mother is asking for him to do this as well. Follow-up of anxiety and mild depression. Says that he still feels irritable, angry, difficult to calm down at times. He feels mildly depressed, but denies any sadness. Feels he can not focus well since his mind is thinking about a lot of things as well  He is snapping at people. Taking lexapro 10 mg since 4/2020 which replaced zoloft. He does not feel it helps. Has been taking Lorazepam sparingly at night only. Last refilled 30 pills 10/20/20  reviewed and no prescriptions from anybody else other than me. Has any alcohol use. Asks about Eskamine. Would just like to feel little bit more level. Hypogonadism. Started Clomid early 2019 and testosterone levels were in the 500s on medication. He also stopped Clomid 5/2020. Feels this may be exacerbating his symptoms as well. Says that his breasts were tender, but not any more. Current Outpatient Medications   Medication Sig    LORazepam (ATIVAN) 2 mg tablet TAKE 1 TABLET BY MOUTH EVERY DAY AS NEEDED FOR ANXIETY    escitalopram oxalate (LEXAPRO) 10 mg tablet Take 1 Tab by mouth daily. No current facility-administered medications for this visit.         Allergies   Allergen Reactions    Bactrim [Sulfamethoprim Ds] Other (comments)       Past Medical History:   Diagnosis Date    Allergic rhinitis     Alopecia, male pattern     Asthma     mild intermittent    Depression with anxiety     took wellbutrin, zoloft. anger management issues    Eczema     GERD (gastroesophageal reflux disease)     HSV-2 (herpes simplex virus 2) infection     Insomnia     Male hypogonadism     took clomid 2016    Psychiatric disorder     depression, anxiety    RLS (restless legs syndrome)     saw Dr. Rosa James 8/19/15. failed mirapex    Sensorineural hearing loss 2013    right. Dr. Carole BAUER  All other systems reviewed and negative, unless mentioned in HPI    Objective:   Vital Signs: (As obtained by patient/caregiver at home)  There were no vitals taken for this visit.      [INSTRUCTIONS:  \"[x]\" Indicates a positive item  \"[]\" Indicates a negative item  -- DELETE ALL ITEMS NOT EXAMINED]    Constitutional: [x] Appears well-developed and well-nourished [x] No apparent distress      [] Abnormal -     Mental status: [x] Alert and awake  [x] Oriented to person/place/time [x] Able to follow commands    [] Abnormal -     Eyes:   EOM    [x]  Normal    [] Abnormal -   Sclera  [x]  Normal    [] Abnormal -          Discharge [x]  None visible   [] Abnormal -     HENT: [x] Normocephalic, atraumatic  [] Abnormal -   [x] Mouth/Throat: Mucous membranes are moist    External Ears [x] Normal  [] Abnormal -    Neck: [x] No visualized mass [] Abnormal -     Pulmonary/Chest: [x] Respiratory effort normal   [x] No visualized signs of difficulty breathing or respiratory distress        [] Abnormal -      Musculoskeletal:   [x] Normal gait with no signs of ataxia         [x] Normal range of motion of neck        [] Abnormal -     Neurological:        [x] No Facial Asymmetry (Cranial nerve 7 motor function) (limited exam due to video visit)          [x] No gaze palsy        [] Abnormal -          Skin:        [x] No significant exanthematous lesions or discoloration noted on facial skin         [] Abnormal -            Psychiatric:       [x] Normal Affect [] Abnormal -        [x] No Hallucinations    Other pertinent observable physical exam findings:-        Assessment & Plan:   Diagnoses and all orders for this visit:    1. Depression with anxiety  2. Primary insomnia  3. Irritability  Irritability, anxiety, mild dysthymia. Multifactorial.  Hypogonadism may contribute. Cannot rule out ADD, mood disorder, personality disorder, bipolar 1. Consider mood stabilizer, stimulant,  or psychiatric consult. That said, he did somewhat better with Zoloft, not lexapro. Try Effexor. Start 37.5 mg daily Titrate in 1 to 2 months. Wean off Lexapro 10 mg by decreasing to 1/2 pill for 2 weeks, then take 1/2 pill every other day for 1 week, then stop. Educated on side effects of fatigue. Continue to use Lorazepam sparingly.  reviewed. With all alcohol and drug use. He voices understanding. Follow-up in 2 to 3 months. 4. Male hypogonadism  Unclear what degree his symptoms are related to this. That said, I think having his testosterone level in a reasonable range is necessary. He is not very interested in resuming care    Preventative labs ordered  Orders Placed This Encounter    LIPID PANEL    CBC W/O DIFF    METABOLIC PANEL, COMPREHENSIVE    PSA W/ REFLX FREE PSA    TESTOSTERONE, FREE+TOTAL    FSH AND LH    TSH 3RD GENERATION    venlafaxine-SR (EFFEXOR-XR) 37.5 mg capsule    LORazepam (ATIVAN) 2 mg tablet   \      We discussed the expected course, resolution and complications of the diagnosis(es) in detail. Medication risks, benefits, costs, interactions, and alternatives were discussed as indicated. I advised him to contact the office if his condition worsens, changes or fails to improve as anticipated. He expressed understanding with the diagnosis(es) and plan. Ivan Patel is a 50 y.o. male being evaluated by a video visit encounter for concerns as above. A caregiver was present when appropriate.  Due to this being a TeleHealth encounter (During [facility] public health emergency), evaluation of the following organ systems was limited: Vitals/Constitutional/EENT/Resp/CV/GI//MS/Neuro/Skin/Heme-Lymph-Imm. Pursuant to the emergency declaration under the [facility], [facility] waiver authority and the [employer] Act, this Virtual  Visit was conducted, with patient's (and/or legal guardian's) consent, to reduce the patient's risk of exposure to COVID-19 and provide necessary medical care. Services were provided through a video synchronous discussion virtually to substitute for in-person clinic visit. Patient and provider were located at their individual homes.     Ceci Villarreal MD

## 2021-03-01 DIAGNOSIS — F51.01 PRIMARY INSOMNIA: ICD-10-CM

## 2021-03-01 RX ORDER — LORAZEPAM 2 MG/1
TABLET ORAL
Qty: 30 TAB | Refills: 0 | Status: SHIPPED | OUTPATIENT
Start: 2021-03-01 | End: 2021-04-01

## 2021-04-05 ENCOUNTER — TELEPHONE (OUTPATIENT)
Dept: INTERNAL MEDICINE CLINIC | Age: 46
End: 2021-04-05

## 2021-04-05 DIAGNOSIS — F51.01 PRIMARY INSOMNIA: ICD-10-CM

## 2021-04-05 RX ORDER — LORAZEPAM 2 MG/1
TABLET ORAL
Qty: 30 TAB | Refills: 1 | Status: SHIPPED | OUTPATIENT
Start: 2021-04-05 | End: 2021-06-22 | Stop reason: SDUPTHER

## 2021-04-05 NOTE — TELEPHONE ENCOUNTER
[facility]   Phone Number:  [phone] (Call me)             General Message/Vendor Calls     Caller's first and last name: N/A       Reason for call: Pharmacy is requiring a VIVIANE number for approval for PT's medication. Callback required yes/no and why:  yes/follow up       Best contact number(s): [phone]       Details to clarify the request: PT has not had medication in 4 days and needs callback before end of day.        Yudi Gilliam

## 2021-04-05 NOTE — TELEPHONE ENCOUNTER
----- Message from [facility] sent at 4/5/2021  1:33 PM EDT -----  Regarding: Dr. Satnam Breen Message/Vendor Calls    Caller's first and last name: Pt       Reason for call: Needs Lorazepam filled. Pharmacy does not have [facility] Number, no longer valid.        Callback required yes/no and why: Yes, to confirm      Best contact number(s): [phone]      Details to clarify the request: [facility]

## 2021-04-16 ENCOUNTER — OFFICE VISIT (OUTPATIENT)
Dept: INTERNAL MEDICINE CLINIC | Age: 46
End: 2021-04-16
Payer: COMMERCIAL

## 2021-04-16 VITALS
OXYGEN SATURATION: 97 % | DIASTOLIC BLOOD PRESSURE: 78 MMHG | RESPIRATION RATE: 14 BRPM | TEMPERATURE: 98.1 F | WEIGHT: 225.6 LBS | HEIGHT: 72 IN | HEART RATE: 68 BPM | BODY MASS INDEX: 30.56 KG/M2 | SYSTOLIC BLOOD PRESSURE: 138 MMHG

## 2021-04-16 DIAGNOSIS — Z00.00 PREVENTATIVE HEALTH CARE: Primary | ICD-10-CM

## 2021-04-16 DIAGNOSIS — R42 VERTIGO: ICD-10-CM

## 2021-04-16 DIAGNOSIS — E29.1 MALE HYPOGONADISM: ICD-10-CM

## 2021-04-16 DIAGNOSIS — Z11.59 ENCOUNTER FOR HEPATITIS C SCREENING TEST FOR LOW RISK PATIENT: ICD-10-CM

## 2021-04-16 DIAGNOSIS — H69.81 EUSTACHIAN TUBE DYSFUNCTION, RIGHT: ICD-10-CM

## 2021-04-16 DIAGNOSIS — F41.8 DEPRESSION WITH ANXIETY: ICD-10-CM

## 2021-04-16 LAB
COMMENT, HOLDF: NORMAL
SAMPLES BEING HELD,HOLD: NORMAL

## 2021-04-16 PROCEDURE — 99213 OFFICE O/P EST LOW 20 MIN: CPT | Performed by: INTERNAL MEDICINE

## 2021-04-16 PROCEDURE — 99396 PREV VISIT EST AGE 40-64: CPT | Performed by: INTERNAL MEDICINE

## 2021-04-16 RX ORDER — CETIRIZINE HYDROCHLORIDE 10 MG/1
CAPSULE, LIQUID FILLED ORAL DAILY
COMMUNITY

## 2021-04-16 RX ORDER — VENLAFAXINE HYDROCHLORIDE 75 MG/1
75 CAPSULE, EXTENDED RELEASE ORAL DAILY
Qty: 30 CAP | Refills: 3 | Status: SHIPPED | OUTPATIENT
Start: 2021-04-16 | End: 2021-05-05 | Stop reason: ALTCHOICE

## 2021-04-17 LAB
ALBUMIN SERPL-MCNC: 4.1 G/DL (ref 3.5–5)
ALBUMIN/GLOB SERPL: 1.4 {RATIO} (ref 1.1–2.2)
ALP SERPL-CCNC: 88 U/L (ref 45–117)
ALT SERPL-CCNC: 21 U/L (ref 12–78)
ANION GAP SERPL CALC-SCNC: 4 MMOL/L (ref 5–15)
AST SERPL-CCNC: 12 U/L (ref 15–37)
BILIRUB SERPL-MCNC: 0.4 MG/DL (ref 0.2–1)
BUN SERPL-MCNC: 11 MG/DL (ref 6–20)
BUN/CREAT SERPL: 14 (ref 12–20)
CALCIUM SERPL-MCNC: 9 MG/DL (ref 8.5–10.1)
CHLORIDE SERPL-SCNC: 108 MMOL/L (ref 97–108)
CHOLEST SERPL-MCNC: 251 MG/DL
CO2 SERPL-SCNC: 27 MMOL/L (ref 21–32)
CREAT SERPL-MCNC: 0.79 MG/DL (ref 0.7–1.3)
ERYTHROCYTE [DISTWIDTH] IN BLOOD BY AUTOMATED COUNT: 13.2 % (ref 11.5–14.5)
FSH SERPL-ACNC: 2.6 MIU/ML
GLOBULIN SER CALC-MCNC: 2.9 G/DL (ref 2–4)
GLUCOSE SERPL-MCNC: 103 MG/DL (ref 65–100)
HCT VFR BLD AUTO: 44.3 % (ref 36.6–50.3)
HCV AB SERPL QL IA: NONREACTIVE
HCV COMMENT,HCGAC: NORMAL
HDLC SERPL-MCNC: 46 MG/DL
HDLC SERPL: 5.5 {RATIO} (ref 0–5)
HGB BLD-MCNC: 14.4 G/DL (ref 12.1–17)
LDLC SERPL CALC-MCNC: 176.2 MG/DL (ref 0–100)
LH SERPL-ACNC: 2.4 MIU/ML
LIPID PROFILE,FLP: ABNORMAL
MCH RBC QN AUTO: 29 PG (ref 26–34)
MCHC RBC AUTO-ENTMCNC: 32.5 G/DL (ref 30–36.5)
MCV RBC AUTO: 89.1 FL (ref 80–99)
NRBC # BLD: 0 K/UL (ref 0–0.01)
NRBC BLD-RTO: 0 PER 100 WBC
PLATELET # BLD AUTO: 317 K/UL (ref 150–400)
PMV BLD AUTO: 10.2 FL (ref 8.9–12.9)
POTASSIUM SERPL-SCNC: 4.1 MMOL/L (ref 3.5–5.1)
PROT SERPL-MCNC: 7 G/DL (ref 6.4–8.2)
RBC # BLD AUTO: 4.97 M/UL (ref 4.1–5.7)
SODIUM SERPL-SCNC: 139 MMOL/L (ref 136–145)
TRIGL SERPL-MCNC: 144 MG/DL (ref ?–150)
TSH SERPL DL<=0.05 MIU/L-ACNC: 2.67 UIU/ML (ref 0.36–3.74)
VLDLC SERPL CALC-MCNC: 28.8 MG/DL
WBC # BLD AUTO: 7.4 K/UL (ref 4.1–11.1)

## 2021-04-17 NOTE — PROGRESS NOTES
Srinivasa Lilly is a 50 y.o. male  Presenting for his annual checkup and health maintenance review and follow-up    Depression anxiety. He has been taking venlafaxine 37.5 mg daily for the past 6 months. Feels it helps some for now. Feels a little bit less irritable. Not napping quite as much. He feels vertigo when he skips taking or couple of days. Also takes lorazepam 2 mg daily as needed. Last refill prescription #30 4/6/21. Hypogonadism. He was taking Clomid until May 2020. Says it may have made him feel little bit more irritable and had mild breast tenderness. He would like to revisit this.   He is now not sure if the medication was the problem or his anxiety in general.    Exercise: moderately active  Diet: generally follows a low fat low cholesterol diet  Health Maintenance   Topic Date Due    Pneumococcal 0-64 years (1 of 1 - PPSV23) 04/16/2022 (Originally 11/4/1981)    COVID-19 Vaccine (2 - Pfizer 2-dose series) 05/06/2021    Lipid Screen  04/16/2026    DTaP/Tdap/Td series (2 - Td) 11/02/2027    Hepatitis C Screening  Completed     Health Maintenance reviewed  Last digital rectal exam:  none  Lab Results   Component Value Date/Time    Prostate Specific Ag 0.5 10/08/2014 08:26 AM    Prostate Specific Ag 0.5 09/30/2009 10:54 AM       Vaccinations reviewed  Immunization History   Administered Date(s) Administered    COVID-19, PFIZER, MRNA, LNP-S, PF, 30MCG/0.3ML DOSE 04/15/2021    Influenza Vaccine 01/16/2013, 11/26/2013, 10/01/2017    Influenza Vaccine (>6 mo Afluria QUAD Vial 66700 (0.25 mL) / 95146 (0.5 mL)) 11/24/2015    Influenza Vaccine (Quad) Mdck Pf (>4 Yrs Flucelvax QUAD [id number]) 11/27/2019    Influenza Vaccine (Quad) PF (>6 Mo Flulaval, Fluarix, and >3 Yrs Afluria, Fluzone 21782) 11/16/2016, 10/10/2017    Influenza Vaccine Split 11/29/2011    Td 10/05/2010    Tdap 11/02/2017       Past Medical History:   Diagnosis Date    Allergic rhinitis     Alopecia, male pattern     Asthma     mild intermittent    Depression with anxiety     took wellbutrin, zoloft. anger management issues    Eczema     GERD (gastroesophageal reflux disease)     HSV-2 (herpes simplex virus 2) infection     Insomnia     Male hypogonadism     took clomid 2016, then early 2019 and levels were in the 500s on medication. stopped Clomid 5/2020.  Psychiatric disorder     depression, anxiety    RLS (restless legs syndrome)     saw Dr. Radha May 8/19/15. failed mirapex    Sensorineural hearing loss 2013    right. Dr. Live Perez      has a past surgical history that includes hx colonoscopy (07/08/2013). Bactrim [sulfamethoprim ds]   Current Outpatient Medications   Medication Sig    Cetirizine (ZyrTEC) 10 mg cap Take  by mouth daily.  venlafaxine-SR (EFFEXOR-XR) 75 mg capsule Take 1 Cap by mouth daily.  LORazepam (ATIVAN) 2 mg tablet TAKE 1 TABLET BY MOUTH DAILY AS NEEDED FOR ANXIETY     No current facility-administered medications for this visit. SOCIAL HX:  reports that he has never smoked. He has never used smokeless tobacco. He reports that he does not drink alcohol or use drugs. FAMILY HX: family history is not on file. He was adopted. Review of Systems - History obtained from the patient  General ROS: negative for - night sweats, weight gain or weight loss  Cardiovascular ROS: no chest pain, dyspnea on exertion, edema    Physical exam  Blood pressure 138/78, pulse 68, temperature 98.1 °F (36.7 °C), temperature source Oral, resp. rate 14, height 6' (1.829 m), weight 225 lb 9.6 oz (102.3 kg), SpO2 97 %. Wt Readings from Last 3 Encounters:   04/16/21 225 lb 9.6 oz (102.3 kg)   04/09/20 217 lb (98.4 kg)   10/08/19 217 lb (98.4 kg)     He appears well, alert and oriented x 3, pleasant and cooperative. Vitals as noted. No rashes or significant lesions. Neck supple and free of adenopathy, or masses. No thyromegaly or carotid bruits.  Cranial nerves normal. Lungs are clear to auscultation. Heart sounds are normal with no murmurs, clicks, gallops or rubs. Abdomen is soft, non- tender, with no masses or organomegaly. Extremities, peripheral pulses and reflexes are normal.  . RECTAL/PROSTATE EXAM: Deferred. Skin is without rashes or suspicious lesions. Assessment and Plan:    1. Preventative health care  He is up-to-date on preventative services. Consider colonoscopy now, but he did have one in 2013. Next screening colonoscopy 2023, essentially age 46.  - METABOLIC PANEL, COMPREHENSIVE; Future  - PSA W/ REFLX FREE PSA; Future  - CBC W/O DIFF; Future  - LIPID PANEL; Future    2. Encounter for hepatitis C screening test for low risk patient  - HEPATITIS C AB    3. Depression with anxiety  Borderline controlled. Target symptoms are anxiety, irritability, mood. I favor increasing venlafaxine because I think it could benefit him overall. Side effects discussed. Cannot stop cold turkey. 4. Male hypogonadism  Unclear current status. He is on no medication. Symptoms are difficult to assess because he has underlying anxiety and mood issues as well. He may be willing to restart Clomid.  - FSH AND LH  - TSH 3RD GENERATION  - TESTOSTERONE, TOTAL, ADULT MALE    5. Vertigo  Intermittent mild vertigo. Eustachian tube dysfunction is contributing. Perhaps also stopping her running out of venlafaxin contributed. Currently asymptomatic. Monitor for now. 6. Eustachian tube dysfunction, right  Try Flonase as needed. Mild.       The patient is asked to make an attempt to improve diet and exercise patterns  Avoid tobacco products, excessive alcohol    Return for yearly wellness visits

## 2021-04-18 LAB
COMMENT, TESC2: ABNORMAL
PSA SERPL-MCNC: 0.4 NG/ML (ref 0–4)
REFLEX CRITERIA: NORMAL
TESTOST SERPL-MCNC: 208 NG/DL (ref 264–916)

## 2021-05-04 ENCOUNTER — TELEPHONE (OUTPATIENT)
Dept: INTERNAL MEDICINE CLINIC | Age: 46
End: 2021-05-04

## 2021-05-04 NOTE — TELEPHONE ENCOUNTER
----- Message from Rosaline Allen sent at 5/4/2021  2:55 PM EDT -----  Regarding: /telephone  Contact: [phone]  General Message/Vendor Calls    Caller's first and last name: N/A      Reason for call: He would like a call back from the nurse today.        Callback required yes/no and why: Yes      Best contact number(s):[phone]       Details to clarify the request: N/A      Rosaline Allen

## 2021-05-04 NOTE — TELEPHONE ENCOUNTER
----- Message from Leena Jewell sent at 5/4/2021  3:51 PM EDT -----  Regarding: /telephone  Contact: [phone]  Patient return call    Caller's first and last name and relationship (if not the patient): N/A      Best contact number(s):[phone]      Whose call is being returned: [facility]      Details to clarify the request: N/A      Leena Jewell

## 2021-05-04 NOTE — TELEPHONE ENCOUNTER
----- Message from Naila Marcos sent at 5/4/2021 11:24 AM EDT -----  Regarding: MD Libra/Telephone  General Message/Vendor Calls    Caller's first and last name: Self      Reason for call: Discuss lab results. Callback required yes/no and why: Yes      Best contact number(s): [phone]      Details to clarify the request: Pt received lab results, would like to discuss. Would like to know if he should start Testosterone treatment, also has high cholesterol and would like to discuss Lipitor.       Naila Marcos

## 2021-05-05 ENCOUNTER — TELEPHONE (OUTPATIENT)
Dept: INTERNAL MEDICINE CLINIC | Age: 46
End: 2021-05-05

## 2021-05-05 DIAGNOSIS — E29.1 MALE HYPOGONADISM: ICD-10-CM

## 2021-05-05 RX ORDER — ATORVASTATIN CALCIUM 10 MG/1
10 TABLET, FILM COATED ORAL DAILY
Qty: 90 TAB | Refills: 1 | Status: SHIPPED | OUTPATIENT
Start: 2021-05-05 | End: 2022-02-28 | Stop reason: SDUPTHER

## 2021-05-05 RX ORDER — SERTRALINE HYDROCHLORIDE 100 MG/1
100 TABLET, FILM COATED ORAL DAILY
Qty: 90 TAB | Refills: 0 | Status: SHIPPED | OUTPATIENT
Start: 2021-05-05 | End: 2021-09-09

## 2021-05-05 RX ORDER — CLOMIPHENE CITRATE 50 MG/1
TABLET ORAL
Qty: 45 TAB | Refills: 1 | Status: SHIPPED | OUTPATIENT
Start: 2021-05-05 | End: 2021-09-09 | Stop reason: ALTCHOICE

## 2021-05-05 NOTE — TELEPHONE ENCOUNTER
Patient dropped off a handwritten note to PCP nurse regarding medications. Patient request a follow up call regarding the written communication. Note may be located @ the front office in PCP Rx folder.  Patient contact [phone] - thank you

## 2021-05-05 NOTE — TELEPHONE ENCOUNTER
Call made to patient to get details on hand written letter that was dropped off at the office. When going over letter that was sent out to patient regarding re starting treatment for low testosterone and cholesterol diet patient started yelling and demanding to talk to Dr. Jenny Ortiz I offered a virtual visit so patient could go over new medication he is requesting he began to yell louder stating that he just seen Dr Jenny Ortiz a week ago and will not be making an appointment. I asked patient not to yell because I am only trying to help patient replies \" oh am I making you upset? \" I replied no but you are yelling at me and I am trying to help. I advised patient that I will give his letter to Dr Jenny Ortiz. Letter in providers folder to review.

## 2021-05-05 NOTE — TELEPHONE ENCOUNTER
Patient has issues with impulse control, anxiety, irritability and is clearly inappropriate. That said, he was responding to my letter, detailing his lab results from April 16. He has high cholesterol and I agree with starting Lipitor 10 mg daily. Rx sent. Marina Palumbo He did not feel like venlafaxine was helping very much and is causing dizziness and took Zoloft in the past without issues. Agree with transitioning back to Zoloft. I sent in Zoloft 100 mg daily. Start by taking 1/2 pill for 2 weeks, then increase. This is the dose he was on before. His testosterone level is very low and he would like to resume clomiphene. Rx sent. Return to clinic in 3 months for an appointment to see me and to repeat your blood work.

## 2021-05-05 NOTE — TELEPHONE ENCOUNTER
----- Message from Larry Iglesias sent at 5/5/2021 11:51 AM EDT -----  Regarding: /telephone  Contact: [phone]  Patient return call    Caller's first and last name and relationship (if not the patient): N/A      Best contact number(s):[phone]       Whose call is being returned: The nurse      Details to clarify the request: He has bad phone signal at home but he is out now.        Larry Iglesias

## 2021-09-09 RX ORDER — PREDNISONE 20 MG/1
40 TABLET ORAL DAILY
Qty: 10 TABLET | Refills: 0 | Status: SHIPPED | OUTPATIENT
Start: 2021-09-09 | End: 2021-09-14

## 2021-09-09 RX ORDER — SERTRALINE HYDROCHLORIDE 100 MG/1
100 TABLET, FILM COATED ORAL DAILY
Qty: 90 TABLET | Refills: 1 | Status: SHIPPED | OUTPATIENT
Start: 2021-09-09 | End: 2022-03-28 | Stop reason: SDUPTHER

## 2021-09-22 ENCOUNTER — VIRTUAL VISIT (OUTPATIENT)
Dept: INTERNAL MEDICINE CLINIC | Age: 46
End: 2021-09-22
Payer: COMMERCIAL

## 2021-09-22 DIAGNOSIS — J45.41 MODERATE PERSISTENT ASTHMA WITH EXACERBATION: ICD-10-CM

## 2021-09-22 DIAGNOSIS — J40 BRONCHITIS: Primary | ICD-10-CM

## 2021-09-22 PROCEDURE — 99214 OFFICE O/P EST MOD 30 MIN: CPT | Performed by: INTERNAL MEDICINE

## 2021-09-22 RX ORDER — AZITHROMYCIN 250 MG/1
TABLET, FILM COATED ORAL
Qty: 6 TABLET | Refills: 0 | Status: SHIPPED | OUTPATIENT
Start: 2021-09-22 | End: 2021-09-27

## 2021-09-22 RX ORDER — PREDNISONE 10 MG/1
TABLET ORAL
Qty: 20 TABLET | Refills: 0 | Status: SHIPPED | OUTPATIENT
Start: 2021-09-22 | End: 2021-09-30

## 2021-09-22 NOTE — PROGRESS NOTES
Consent: Joan Gatica, who was seen by synchronous (real-time) audio-video technology, and/or his healthcare decision maker, is aware that this patient-initiated, Telehealth encounter on 9/22/2021 is a billable service, with coverage as determined by his insurance carrier. He is aware that he may receive a bill and has provided verbal consent to proceed: Yes. I was in the office while conducting this encounter. Patient identification was verified at the start of the visit: YES  This visit was done with doxy. me  The patient is located in [place] during this visit    Note   Chief Complaint   Cough    Joan Gatica is a 50 y.o. male     1. Bronchitis  -     azithromycin (ZITHROMAX) 250 mg tablet; Take 2 Tablets by mouth daily for 1 day, THEN 1 Tablet daily for 4 days. , Normal, Disp-6 Tablet, R-0  -     predniSONE (DELTASONE) 10 mg tablet; Take 40 mg by mouth daily (with breakfast) for 2 days, THEN 30 mg daily (with breakfast) for 2 days, THEN 20 mg daily (with breakfast) for 2 days, THEN 10 mg daily (with breakfast) for 2 days. , Normal, Disp-20 Tablet, R-0  2. Moderate persistent asthma with exacerbation  Developed sinus congestion and a cough with wheezing and was given prednisone for 5 days. Reports that he continues to have a productive cough and wheezing. Has been using his albuterol inhaler. No fever, chills. Initially had some sinus congestion and drainage but that has since improved. Azithromycin and prednisone taper sent to pharmacy. Recommend Covid testing. Advised to call if symptoms do not improve    We discussed the expected course, resolution and complications of the diagnosis(es) in detail. Medication risks, benefits, costs, interactions, and alternatives were discussed as indicated. I advised him to contact the office if his condition worsens, changes or fails to improve as anticipated. He expressed understanding with the diagnosis(es) and plan.      Return to clinic:  As needed    Queenie Bautista MD  [facility]  9/22/2021    No future appointments. Objective   Vitals:       Patient-Reported Vitals 9/22/2021   Patient-Reported Weight patient refused                 Physical Exam  Constitutional:       Appearance: Normal appearance. He is not ill-appearing. Pulmonary:      Effort: No respiratory distress. Neurological:      Mental Status: He is alert. Current Outpatient Medications   Medication Sig    azithromycin (ZITHROMAX) 250 mg tablet Take 2 Tablets by mouth daily for 1 day, THEN 1 Tablet daily for 4 days.  predniSONE (DELTASONE) 10 mg tablet Take 40 mg by mouth daily (with breakfast) for 2 days, THEN 30 mg daily (with breakfast) for 2 days, THEN 20 mg daily (with breakfast) for 2 days, THEN 10 mg daily (with breakfast) for 2 days.  sertraline (ZOLOFT) 100 mg tablet Take 1 Tablet by mouth daily.  LORazepam (ATIVAN) 2 mg tablet TAKE 1 TABLET BY MOUTH DAILY AS NEEDED FOR ANXIETY    atorvastatin (LIPITOR) 10 mg tablet Take 1 Tab by mouth daily.  Cetirizine (ZyrTEC) 10 mg cap Take  by mouth daily. No current facility-administered medications for this visit. Ruth Nayak is a 50 y.o. male being evaluated by a video visit encounter for concerns as above. A caregiver was present when appropriate. Due to this being a TeleHealth encounter (During [facility]-99 public health emergency), evaluation of the following organ systems was limited: Vitals/Constitutional/EENT/Resp/CV/GI//MS/Neuro/Skin/Heme-Lymph-Imm. Pursuant to the emergency declaration under the [facility], 1135 waiver authority and the [employer] and [employer] Act, this Virtual  Visit was conducted, with patient's (and/or legal guardian's) consent, to reduce the patient's risk of exposure to COVID-19 and provide necessary medical care.      Services were provided through a video synchronous discussion virtually to substitute for in-person clinic visit.

## 2021-11-22 ENCOUNTER — PATIENT MESSAGE (OUTPATIENT)
Dept: INTERNAL MEDICINE CLINIC | Age: 46
End: 2021-11-22

## 2021-11-22 DIAGNOSIS — F51.01 PRIMARY INSOMNIA: ICD-10-CM

## 2021-11-22 RX ORDER — LORAZEPAM 2 MG/1
TABLET ORAL
Qty: 30 TABLET | Refills: 2 | Status: SHIPPED | OUTPATIENT
Start: 2021-11-22 | End: 2022-02-28 | Stop reason: SDUPTHER

## 2021-12-21 ENCOUNTER — TELEPHONE (OUTPATIENT)
Dept: INTERNAL MEDICINE CLINIC | Age: 46
End: 2021-12-21

## 2021-12-21 NOTE — TELEPHONE ENCOUNTER
Spoke with patient and her reports a cough with congestion with greenish sputum. Patient denies any fever loss of taste. Pt states he has not been tested for COVID. Patient reports his daughter is also sick with respiratory symptoms. Advised to go to Urgent care to be evaluated and treated and patient declines. Patient reports that he has Asthma and that. Patient requests that Dr. Thu Arboleda call in prescriptions for Prednisone and antibiotic if he thinks that is necessary. Advised patient that if his symptoms worsen or do not resolve and if he should have difficulty breathing that he should go to urgent care for evaluation. Patient states \"That's not going to happen\". Dr. Thu Arboleda notified.

## 2021-12-21 NOTE — TELEPHONE ENCOUNTER
----- Message from Nikunj Goodman sent at 12/21/2021 10:43 AM EST -----  Subject: Message to Provider    QUESTIONS  Information for Provider? patient has a cough would like for a script to   be sent in   ---------------------------------------------------------------------------  --------------  [address]  What is the best way for the office to contact you? OK to leave message on   voicemail  Preferred Call Back Phone Number? [phone]  ---------------------------------------------------------------------------  --------------  SCRIPT ANSWERS  Relationship to Patient?  Self

## 2021-12-22 ENCOUNTER — PATIENT MESSAGE (OUTPATIENT)
Dept: INTERNAL MEDICINE CLINIC | Age: 46
End: 2021-12-22

## 2021-12-22 NOTE — TELEPHONE ENCOUNTER
Spoke with patient and advised to go to urgent care for his respiratory symptoms for evaluation and treatment as per Dr. Omega Gates instruction.

## 2021-12-22 NOTE — TELEPHONE ENCOUNTER
----- Message from Amol Lambert sent at 12/22/2021  2:58 PM EST -----  Subject: Message to Provider    QUESTIONS  Information for Provider? Patient called in states he needs prednisone and   antibiotic called in states he doesn't have covid so no need for him to be   tested please call patient  ---------------------------------------------------------------------------  --------------  CALL BACK INFO  What is the best way for the office to contact you? OK to leave message on   voicemail  Preferred Call Back Phone Number? [phone]  ---------------------------------------------------------------------------  --------------  SCRIPT ANSWERS  Relationship to Patient?  Self

## 2022-03-28 ENCOUNTER — PATIENT MESSAGE (OUTPATIENT)
Dept: INTERNAL MEDICINE CLINIC | Age: 47
End: 2022-03-28

## 2022-03-28 RX ORDER — SERTRALINE HYDROCHLORIDE 100 MG/1
100 TABLET, FILM COATED ORAL DAILY
Qty: 90 TABLET | Refills: 1 | Status: SHIPPED | OUTPATIENT
Start: 2022-03-28 | End: 2022-09-28

## 2022-03-28 NOTE — TELEPHONE ENCOUNTER
From: Fidel Vasquez  To: Anitha Hurt MD  Sent: 3/28/2022 1:31 PM EDT  Subject: Sertraline refill    Hey could I get a refill on sertraline? I like it sent to [address] [place] [address] as that's where I live now in the pharmacy we use. I'm going to go ahead and schedule an appointment with you also as the last time I got a prescription filled they asked me to schedule an appointment with you.  Thank you

## 2022-04-04 ENCOUNTER — OFFICE VISIT (OUTPATIENT)
Dept: INTERNAL MEDICINE CLINIC | Age: 47
End: 2022-04-04
Payer: COMMERCIAL

## 2022-04-04 VITALS
TEMPERATURE: 98.1 F | SYSTOLIC BLOOD PRESSURE: 134 MMHG | HEIGHT: 72 IN | BODY MASS INDEX: 29.77 KG/M2 | HEART RATE: 75 BPM | RESPIRATION RATE: 16 BRPM | OXYGEN SATURATION: 96 % | WEIGHT: 219.8 LBS | DIASTOLIC BLOOD PRESSURE: 91 MMHG

## 2022-04-04 DIAGNOSIS — D22.30 ATYPICAL NEVUS OF FACE: ICD-10-CM

## 2022-04-04 DIAGNOSIS — F41.8 DEPRESSION WITH ANXIETY: ICD-10-CM

## 2022-04-04 DIAGNOSIS — Z12.11 SCREEN FOR COLON CANCER: ICD-10-CM

## 2022-04-04 DIAGNOSIS — E78.2 MIXED HYPERLIPIDEMIA: Primary | ICD-10-CM

## 2022-04-04 PROCEDURE — 99213 OFFICE O/P EST LOW 20 MIN: CPT | Performed by: INTERNAL MEDICINE

## 2022-04-04 NOTE — PROGRESS NOTES
HISTORY OF PRESENT ILLNESS    Chief Complaint   Patient presents with    Medication Refill     Cholesterol       Presents for follow-up  He is overall doing fairly well. 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 Feels that he is a lot less stressed these days. Hyperlipidemia  Currently he takes atorvastatin 10 mg  ROS: taking medications as instructed, no medication side effects noted  No new myalgias, no joint pains, no weakness  No TIA's, no chest pain on exertion, no dyspnea on exertion, no swelling of ankles. Lab Results   Component Value Date/Time    Cholesterol, total 251 (H) 04/16/2021 11:22 AM    HDL Cholesterol 46 04/16/2021 11:22 AM    LDL, calculated 176.2 (H) 04/16/2021 11:22 AM    VLDL, calculated 28.8 04/16/2021 11:22 AM    Triglyceride 144 04/16/2021 11:22 AM    CHOL/HDL Ratio 5.5 (H) 04/16/2021 11:22 AM     Anxiety  Patient is seen for anxiety disorder. Current treatment includes Zoloft and lorazepam 2 mg nightly most nights. Ongoing symptoms include: none. Patient denies: sweating, chest pain, dizziness, paresthesias, racing thoughts, feelings of losing control, difficulty concentrating, suicidal ideation. Denies substance or alcohol abuse. Reported side effects from the treatment: none. Review of Systems   All other systems reviewed and are negative, except as noted in HPI    Past Medical and Surgical History   has a past medical history of Allergic rhinitis, Alopecia, male pattern, Asthma, Depression with anxiety, Eczema, GERD (gastroesophageal reflux disease), HSV-2 (herpes simplex virus 2) infection, Insomnia, Male hypogonadism, RLS (restless legs syndrome), and Sensorineural hearing loss (2013). has a past surgical history that includes hx colonoscopy (07/08/2013). reports that he has never smoked. He has never used smokeless tobacco. He reports that he does not drink alcohol and does not use drugs. family history is not on file.  He was adopted. Physical Exam   Nursing note and vitals reviewed. Blood pressure (!) 134/91, pulse 75, temperature 98.1 °F (36.7 °C), temperature source Oral, resp. rate 16, height 6' (1.829 m), weight 219 lb 12.8 oz (99.7 kg), SpO2 96 %. Constitutional:  No distress. Eyes: Conjunctivae are normal.   Ears:  Hearing grossly intact  Cardiovascular: Normal rate. regular rhythm, no murmurs or gallops  No edema  Pulmonary/Chest: Effort normal.   CTAB  Musculoskeletal: moves all 4 extremities   Neurological: Alert and oriented to person, place, and time. Skin: No visible rash noted. Psychiatric: Normal mood and affect. Behavior is normal.     Assessment and Plan    Diagnoses and all orders for this visit:    1. Mixed hyperlipidemia  Based on my history and available data, the overall control of this problem is unclear. Will adjust medications and plan of care based on further evaluation.   -     LIPID PANEL; Future  -     METABOLIC PANEL, COMPREHENSIVE; Future  -     CBC W/O DIFF; Future    2. Depression with anxiety  This condition is controlled on current medication regimen as written in medication list.  Medications refilled. Discussed long-term use of benzodiazepines and I think it would be best to try to wean back his lorazepam very gradually over the course of the next year or 2. Risk of cognitive impairment/dementia and certainly he is dependent on medication for sleep. Take 1/2 pill once or twice per week and then gradually start to wean back from there. Continue sertraline. We may then consider changing sertraline since perhaps that is exacerbating his sleep issue. Overall anxiety is recently well controlled so no changes today.  profile was accessed online for [employer] and reviewed by me during this encounter. I did not see evidence of inappropriate or suspicious controlled substance prescription activity. 3. Screen for colon cancer  He is average risk.   Recommend screening colonoscopy based on current guidelines with indicated after age 39.  -     REFERRAL TO GASTROENTEROLOGY    4. Atypical nevus of face  Small round lesion on cheek which I think warrants additional evaluation for early basal cell. -     REFERRAL TO DERMATOLOGY        lab results and schedule of future lab studies reviewed with patient  reviewed medications and side effects in detail    Return to clinic for further evaluation if new symptoms develop        Current Outpatient Medications   Medication Sig    sertraline (ZOLOFT) 100 mg tablet Take 1 Tablet by mouth daily.  LORazepam (ATIVAN) 2 mg tablet TAKE 1 TABLET BY MOUTH DAILY AS NEEDED FOR ANXIETY    atorvastatin (LIPITOR) 10 mg tablet Take 1 Tablet by mouth daily.  Cetirizine (ZyrTEC) 10 mg cap Take  by mouth daily. No current facility-administered medications for this visit.

## 2022-04-05 DIAGNOSIS — E78.2 MIXED HYPERLIPIDEMIA: ICD-10-CM

## 2022-04-05 LAB
ALBUMIN SERPL-MCNC: 4 G/DL (ref 3.5–5)
ALBUMIN/GLOB SERPL: 1.3 {RATIO} (ref 1.1–2.2)
ALP SERPL-CCNC: 91 U/L (ref 45–117)
ALT SERPL-CCNC: 25 U/L (ref 12–78)
ANION GAP SERPL CALC-SCNC: 3 MMOL/L (ref 5–15)
AST SERPL-CCNC: 14 U/L (ref 15–37)
BILIRUB SERPL-MCNC: 0.3 MG/DL (ref 0.2–1)
BUN SERPL-MCNC: 11 MG/DL (ref 6–20)
BUN/CREAT SERPL: 14 (ref 12–20)
CALCIUM SERPL-MCNC: 9.1 MG/DL (ref 8.5–10.1)
CHLORIDE SERPL-SCNC: 107 MMOL/L (ref 97–108)
CHOLEST SERPL-MCNC: 206 MG/DL
CO2 SERPL-SCNC: 28 MMOL/L (ref 21–32)
CREAT SERPL-MCNC: 0.78 MG/DL (ref 0.7–1.3)
ERYTHROCYTE [DISTWIDTH] IN BLOOD BY AUTOMATED COUNT: 13.6 % (ref 11.5–14.5)
GLOBULIN SER CALC-MCNC: 3.1 G/DL (ref 2–4)
GLUCOSE SERPL-MCNC: 93 MG/DL (ref 65–100)
HCT VFR BLD AUTO: 44 % (ref 36.6–50.3)
HDLC SERPL-MCNC: 48 MG/DL
HDLC SERPL: 4.3 {RATIO} (ref 0–5)
HGB BLD-MCNC: 14.4 G/DL (ref 12.1–17)
LDLC SERPL CALC-MCNC: 127.2 MG/DL (ref 0–100)
MCH RBC QN AUTO: 28.8 PG (ref 26–34)
MCHC RBC AUTO-ENTMCNC: 32.7 G/DL (ref 30–36.5)
MCV RBC AUTO: 88 FL (ref 80–99)
NRBC # BLD: 0 K/UL (ref 0–0.01)
NRBC BLD-RTO: 0 PER 100 WBC
PLATELET # BLD AUTO: 329 K/UL (ref 150–400)
PMV BLD AUTO: 10.4 FL (ref 8.9–12.9)
POTASSIUM SERPL-SCNC: 4.2 MMOL/L (ref 3.5–5.1)
PROT SERPL-MCNC: 7.1 G/DL (ref 6.4–8.2)
RBC # BLD AUTO: 5 M/UL (ref 4.1–5.7)
SODIUM SERPL-SCNC: 138 MMOL/L (ref 136–145)
TRIGL SERPL-MCNC: 154 MG/DL (ref ?–150)
VLDLC SERPL CALC-MCNC: 30.8 MG/DL
WBC # BLD AUTO: 7.9 K/UL (ref 4.1–11.1)

## 2022-04-05 RX ORDER — ATORVASTATIN CALCIUM 20 MG/1
20 TABLET, FILM COATED ORAL DAILY
Qty: 90 TABLET | Refills: 1 | Status: SHIPPED | OUTPATIENT
Start: 2022-04-05

## 2022-07-26 DIAGNOSIS — F51.01 PRIMARY INSOMNIA: ICD-10-CM

## 2022-07-27 RX ORDER — LORAZEPAM 2 MG/1
TABLET ORAL
Qty: 30 TABLET | Refills: 2 | Status: SHIPPED | OUTPATIENT
Start: 2022-07-27

## 2022-09-28 RX ORDER — SERTRALINE HYDROCHLORIDE 100 MG/1
TABLET, FILM COATED ORAL
Qty: 90 TABLET | Refills: 1 | Status: SHIPPED | OUTPATIENT
Start: 2022-09-28

## 2022-12-30 NOTE — TELEPHONE ENCOUNTER
Chief Complaint   Patient presents with     Prenatal Care     35 weeks 3 days      Patient states his pharmacy has not received a script for his Lorazepam . Requesting we send in script again .

## 2023-01-09 DIAGNOSIS — F51.01 PRIMARY INSOMNIA: ICD-10-CM

## 2023-01-09 RX ORDER — LORAZEPAM 2 MG/1
TABLET ORAL
Qty: 30 TABLET | Refills: 0 | Status: SHIPPED | OUTPATIENT
Start: 2023-01-09

## 2023-02-08 DIAGNOSIS — F51.01 PRIMARY INSOMNIA: ICD-10-CM

## 2023-02-09 RX ORDER — LORAZEPAM 2 MG/1
TABLET ORAL
Qty: 30 TABLET | Refills: 0 | Status: SHIPPED | OUTPATIENT
Start: 2023-02-09

## 2023-03-07 DIAGNOSIS — F41.8 DEPRESSION WITH ANXIETY: Primary | ICD-10-CM

## 2023-03-07 DIAGNOSIS — F51.01 PRIMARY INSOMNIA: ICD-10-CM

## 2023-03-08 RX ORDER — LORAZEPAM 2 MG/1
TABLET ORAL
Qty: 30 TABLET | Refills: 0 | Status: SHIPPED | OUTPATIENT
Start: 2023-03-08

## 2023-03-08 RX ORDER — SERTRALINE HYDROCHLORIDE 100 MG/1
100 TABLET, FILM COATED ORAL DAILY
Qty: 90 TABLET | Refills: 0 | Status: SHIPPED | OUTPATIENT
Start: 2023-03-08

## 2023-05-09 DIAGNOSIS — F41.8 OTHER SPECIFIED ANXIETY DISORDERS: Primary | ICD-10-CM

## 2023-05-09 RX ORDER — LORAZEPAM 2 MG/1
TABLET ORAL
Qty: 30 TABLET | Refills: 0 | Status: SHIPPED | OUTPATIENT
Start: 2023-05-09 | End: 2023-06-08

## 2023-05-09 NOTE — TELEPHONE ENCOUNTER
Patient needs LORazepam refilled     [id number] Yasmeen Shady, [address]   [facility] [place] [phone]   Phone:  [phone]

## 2023-06-30 ENCOUNTER — PATIENT MESSAGE (OUTPATIENT)
Age: 48
End: 2023-06-30

## 2023-06-30 DIAGNOSIS — F41.8 OTHER SPECIFIED ANXIETY DISORDERS: Primary | ICD-10-CM

## 2023-06-30 RX ORDER — LORAZEPAM 2 MG/1
2 TABLET ORAL DAILY PRN
Qty: 30 TABLET | Refills: 0 | Status: SHIPPED | OUTPATIENT
Start: 2023-06-30 | End: 2023-07-30

## 2023-07-03 ENCOUNTER — OFFICE VISIT (OUTPATIENT)
Age: 48
End: 2023-07-03
Payer: COMMERCIAL

## 2023-07-03 VITALS
SYSTOLIC BLOOD PRESSURE: 120 MMHG | WEIGHT: 213.8 LBS | DIASTOLIC BLOOD PRESSURE: 80 MMHG | BODY MASS INDEX: 28.96 KG/M2 | TEMPERATURE: 98 F | OXYGEN SATURATION: 96 % | HEIGHT: 72 IN | HEART RATE: 76 BPM | RESPIRATION RATE: 19 BRPM

## 2023-07-03 DIAGNOSIS — Z00.00 PREVENTATIVE HEALTH CARE: Primary | ICD-10-CM

## 2023-07-03 DIAGNOSIS — Z00.00 PREVENTATIVE HEALTH CARE: ICD-10-CM

## 2023-07-03 DIAGNOSIS — F41.8 OTHER SPECIFIED ANXIETY DISORDERS: ICD-10-CM

## 2023-07-03 DIAGNOSIS — E78.2 MIXED HYPERLIPIDEMIA: ICD-10-CM

## 2023-07-03 DIAGNOSIS — Z12.11 SCREEN FOR COLON CANCER: ICD-10-CM

## 2023-07-03 DIAGNOSIS — G25.81 RLS (RESTLESS LEGS SYNDROME): ICD-10-CM

## 2023-07-03 LAB
ALBUMIN SERPL-MCNC: 3.5 G/DL (ref 3.5–5)
ALBUMIN/GLOB SERPL: 1.2 (ref 1.1–2.2)
ALP SERPL-CCNC: 73 U/L (ref 45–117)
ALT SERPL-CCNC: 16 U/L (ref 12–78)
ANION GAP SERPL CALC-SCNC: 3 MMOL/L (ref 5–15)
AST SERPL-CCNC: 12 U/L (ref 15–37)
BILIRUB SERPL-MCNC: 0.2 MG/DL (ref 0.2–1)
BUN SERPL-MCNC: 10 MG/DL (ref 6–20)
BUN/CREAT SERPL: 14 (ref 12–20)
CALCIUM SERPL-MCNC: 9 MG/DL (ref 8.5–10.1)
CHLORIDE SERPL-SCNC: 106 MMOL/L (ref 97–108)
CHOLEST SERPL-MCNC: 233 MG/DL
CO2 SERPL-SCNC: 29 MMOL/L (ref 21–32)
CREAT SERPL-MCNC: 0.74 MG/DL (ref 0.7–1.3)
ERYTHROCYTE [DISTWIDTH] IN BLOOD BY AUTOMATED COUNT: 13.2 % (ref 11.5–14.5)
EST. AVERAGE GLUCOSE BLD GHB EST-MCNC: 114 MG/DL
GLOBULIN SER CALC-MCNC: 2.9 G/DL (ref 2–4)
GLUCOSE SERPL-MCNC: 111 MG/DL (ref 65–100)
HBA1C MFR BLD: 5.6 % (ref 4–5.6)
HCT VFR BLD AUTO: 40.4 % (ref 36.6–50.3)
HDLC SERPL-MCNC: 35 MG/DL
HDLC SERPL: 6.7 (ref 0–5)
HGB BLD-MCNC: 13.2 G/DL (ref 12.1–17)
LDLC SERPL CALC-MCNC: 119.4 MG/DL (ref 0–100)
MCH RBC QN AUTO: 28.9 PG (ref 26–34)
MCHC RBC AUTO-ENTMCNC: 32.7 G/DL (ref 30–36.5)
MCV RBC AUTO: 88.4 FL (ref 80–99)
NRBC # BLD: 0 K/UL (ref 0–0.01)
NRBC BLD-RTO: 0 PER 100 WBC
PLATELET # BLD AUTO: 293 K/UL (ref 150–400)
PMV BLD AUTO: 10.3 FL (ref 8.9–12.9)
POTASSIUM SERPL-SCNC: 4.2 MMOL/L (ref 3.5–5.1)
PROT SERPL-MCNC: 6.4 G/DL (ref 6.4–8.2)
RBC # BLD AUTO: 4.57 M/UL (ref 4.1–5.7)
SODIUM SERPL-SCNC: 138 MMOL/L (ref 136–145)
TRIGL SERPL-MCNC: 393 MG/DL
VLDLC SERPL CALC-MCNC: 78.6 MG/DL
WBC # BLD AUTO: 7.5 K/UL (ref 4.1–11.1)

## 2023-07-03 PROCEDURE — 99213 OFFICE O/P EST LOW 20 MIN: CPT | Performed by: INTERNAL MEDICINE

## 2023-07-03 PROCEDURE — 99396 PREV VISIT EST AGE 40-64: CPT | Performed by: INTERNAL MEDICINE

## 2023-07-03 RX ORDER — SERTRALINE HYDROCHLORIDE 100 MG/1
100 TABLET, FILM COATED ORAL DAILY
Qty: 90 TABLET | Refills: 1 | Status: SHIPPED | OUTPATIENT
Start: 2023-07-03

## 2023-07-03 RX ORDER — PRAMIPEXOLE DIHYDROCHLORIDE 0.12 MG/1
0.12 TABLET ORAL NIGHTLY
Qty: 30 TABLET | Refills: 0 | Status: SHIPPED | OUTPATIENT
Start: 2023-07-03

## 2023-07-03 SDOH — ECONOMIC STABILITY: HOUSING INSECURITY
IN THE LAST 12 MONTHS, WAS THERE A TIME WHEN YOU DID NOT HAVE A STEADY PLACE TO SLEEP OR SLEPT IN A SHELTER (INCLUDING NOW)?: NO

## 2023-07-03 SDOH — ECONOMIC STABILITY: FOOD INSECURITY: WITHIN THE PAST 12 MONTHS, THE FOOD YOU BOUGHT JUST DIDN'T LAST AND YOU DIDN'T HAVE MONEY TO GET MORE.: NEVER TRUE

## 2023-07-03 SDOH — ECONOMIC STABILITY: FOOD INSECURITY: WITHIN THE PAST 12 MONTHS, YOU WORRIED THAT YOUR FOOD WOULD RUN OUT BEFORE YOU GOT MONEY TO BUY MORE.: NEVER TRUE

## 2023-07-03 SDOH — ECONOMIC STABILITY: INCOME INSECURITY: HOW HARD IS IT FOR YOU TO PAY FOR THE VERY BASICS LIKE FOOD, HOUSING, MEDICAL CARE, AND HEATING?: NOT HARD AT ALL

## 2023-07-03 ASSESSMENT — PATIENT HEALTH QUESTIONNAIRE - PHQ9
4. FEELING TIRED OR HAVING LITTLE ENERGY: 0
3. TROUBLE FALLING OR STAYING ASLEEP: 0
SUM OF ALL RESPONSES TO PHQ QUESTIONS 1-9: 0
5. POOR APPETITE OR OVEREATING: 0
8. MOVING OR SPEAKING SO SLOWLY THAT OTHER PEOPLE COULD HAVE NOTICED. OR THE OPPOSITE, BEING SO FIGETY OR RESTLESS THAT YOU HAVE BEEN MOVING AROUND A LOT MORE THAN USUAL: 0
1. LITTLE INTEREST OR PLEASURE IN DOING THINGS: 0
10. IF YOU CHECKED OFF ANY PROBLEMS, HOW DIFFICULT HAVE THESE PROBLEMS MADE IT FOR YOU TO DO YOUR WORK, TAKE CARE OF THINGS AT HOME, OR GET ALONG WITH OTHER PEOPLE: 0
SUM OF ALL RESPONSES TO PHQ QUESTIONS 1-9: 0
9. THOUGHTS THAT YOU WOULD BE BETTER OFF DEAD, OR OF HURTING YOURSELF: 0
2. FEELING DOWN, DEPRESSED OR HOPELESS: 0
SUM OF ALL RESPONSES TO PHQ9 QUESTIONS 1 & 2: 0
6. FEELING BAD ABOUT YOURSELF - OR THAT YOU ARE A FAILURE OR HAVE LET YOURSELF OR YOUR FAMILY DOWN: 0
7. TROUBLE CONCENTRATING ON THINGS, SUCH AS READING THE NEWSPAPER OR WATCHING TELEVISION: 0
SUM OF ALL RESPONSES TO PHQ QUESTIONS 1-9: 0
SUM OF ALL RESPONSES TO PHQ QUESTIONS 1-9: 0

## 2023-07-03 NOTE — PROGRESS NOTES
Aleksey Blackburn is a 50 y.o. male  Presenting for his annual checkup and health maintenance review and follow-up    He is with his daughter. [employer] is doing very well. He is happy. Anxiety  Taking sertraline 100 mg  daily, lorazepam 1/2- 2 mg hs. Feels he is doing very well. Denies depression, excessive alcohol use    Reports RLS s/s. Both legs kick, arms are waving at night. Sleeping fairly well, however. Saw sleep specialist Dr. Paola Baxter in the past.. Appears to have taken Requip in the past which may have caused some degree of dizziness? His friend takes another medication and he is interested in maybe considering that. Not sure what it is. Hyperlipidemia  Ran out of atorvastatin a few months ago. No new myalgias, no joint pains, no weakness  No TIA's, no chest pain on exertion, no dyspnea on exertion, no swelling of ankles.    Lab Results   Component Value Date/Time    CHOL 206 04/04/2022 03:13 PM    HDL 48 04/04/2022 03:13 PM     Wt Readings from Last 3 Encounters:   07/03/23 213 lb 12.8 oz (97 kg)   04/04/22 219 lb 12.8 oz (99.7 kg)   12/04/18 210 lb (95.3 kg)       Lifestyle: not attempting to follow a low fat, low cholesterol diet, sedentary    Health Maintenance   Topic Date Due    Pneumococcal 0-64 years Vaccine (1 - PCV) Never done    Colorectal Cancer Screen  Never done    COVID-19 Vaccine (3 - Booster for Pfizer series) 06/10/2021    Lipids  04/04/2023    Depression Monitoring  04/04/2023    DTaP/Tdap/Td vaccine (2 - Td or Tdap) 11/02/2027    Hepatitis C screen  Completed    HIV screen  Completed    Hepatitis A vaccine  Aged Out    Hib vaccine  Aged Out    Meningococcal (ACWY) vaccine  Aged Out    Flu vaccine  Discontinued     Health Maintenance reviewed  Last digital rectal exam:  none  Lab Results   Component Value Date/Time    PSA 0.4 04/16/2021 11:22 AM       Vaccinations reviewed  Immunization History   Administered Date(s) Administered    COVID-19, PFIZER PURPLE

## 2023-08-01 DIAGNOSIS — G25.81 RLS (RESTLESS LEGS SYNDROME): ICD-10-CM

## 2023-08-01 RX ORDER — PRAMIPEXOLE DIHYDROCHLORIDE 0.12 MG/1
TABLET ORAL
Qty: 90 TABLET | Refills: 0 | Status: SHIPPED | OUTPATIENT
Start: 2023-08-01

## 2023-08-25 DIAGNOSIS — F41.8 OTHER SPECIFIED ANXIETY DISORDERS: ICD-10-CM

## 2023-08-25 RX ORDER — LORAZEPAM 2 MG/1
2 TABLET ORAL DAILY PRN
Qty: 30 TABLET | Refills: 0 | Status: SHIPPED | OUTPATIENT
Start: 2023-08-25 | End: 2023-09-24

## 2024-02-22 DIAGNOSIS — F41.8 OTHER SPECIFIED ANXIETY DISORDERS: ICD-10-CM

## 2024-02-22 DIAGNOSIS — G25.81 RLS (RESTLESS LEGS SYNDROME): ICD-10-CM

## 2024-02-22 RX ORDER — LORAZEPAM 2 MG/1
2 TABLET ORAL DAILY PRN
Qty: 30 TABLET | Refills: 2 | Status: SHIPPED | OUTPATIENT
Start: 2024-02-22 | End: 2024-05-22

## 2024-02-24 RX ORDER — PRAMIPEXOLE DIHYDROCHLORIDE 0.12 MG/1
0.12 TABLET ORAL NIGHTLY
Qty: 90 TABLET | Refills: 1 | Status: SHIPPED | OUTPATIENT
Start: 2024-02-24

## 2024-04-07 DIAGNOSIS — F41.8 OTHER SPECIFIED ANXIETY DISORDERS: ICD-10-CM

## 2024-04-08 RX ORDER — SERTRALINE HYDROCHLORIDE 100 MG/1
100 TABLET, FILM COATED ORAL DAILY
Qty: 90 TABLET | Refills: 1 | Status: SHIPPED | OUTPATIENT
Start: 2024-04-08

## 2024-06-16 ENCOUNTER — HOSPITAL ENCOUNTER (EMERGENCY)
Facility: HOSPITAL | Age: 49
Discharge: LEFT BEFORE DISCHARGE | End: 2024-06-16

## 2024-06-16 VITALS
OXYGEN SATURATION: 95 % | WEIGHT: 213 LBS | TEMPERATURE: 100.1 F | DIASTOLIC BLOOD PRESSURE: 72 MMHG | HEART RATE: 69 BPM | HEIGHT: 72 IN | BODY MASS INDEX: 28.85 KG/M2 | SYSTOLIC BLOOD PRESSURE: 111 MMHG | RESPIRATION RATE: 16 BRPM

## 2024-06-16 ASSESSMENT — PAIN DESCRIPTION - LOCATION: LOCATION: GENERALIZED

## 2024-06-16 ASSESSMENT — PAIN SCALES - GENERAL: PAINLEVEL_OUTOF10: 5

## 2024-06-16 ASSESSMENT — PAIN DESCRIPTION - DESCRIPTORS: DESCRIPTORS: ACHING

## 2024-06-16 ASSESSMENT — PAIN DESCRIPTION - PAIN TYPE: TYPE: ACUTE PAIN

## 2024-06-17 NOTE — ED TRIAGE NOTES
Pt reports fever, body aches, and chills x1 week. Pt states he was exposed to his daughter who tested positive for E. Coli, C. Diff, and Norovirus. Pt also reports having multiple tick bites that are bothering him.

## 2024-06-17 NOTE — ED NOTES
Patient walked to nurses station and stated \"I have been here over an hour and you need to send a doctor in now, or soon\" this RN told the patient that we have one provider and that we will be in to see him as soon as possible. Patient states \"I need someone to come in, I only care about me getting taken care of.\" This nurse attempted to tell the patient he would be up next to be seen but the patient walked back to his room.

## 2024-06-17 NOTE — ED NOTES
Patient and family member walked out of room and patient aggressively walks through nurses station stating \"thanks for nothing' and proceeded to exit building making comments and ringing bells set out in lateral hallway. Patient does not wish to continue to wait left prior to medical screening.

## 2024-08-04 ENCOUNTER — PATIENT MESSAGE (OUTPATIENT)
Age: 49
End: 2024-08-04

## 2024-08-04 DIAGNOSIS — F41.8 DEPRESSION WITH ANXIETY: Primary | ICD-10-CM

## 2024-08-04 DIAGNOSIS — F41.8 OTHER SPECIFIED ANXIETY DISORDERS: ICD-10-CM

## 2024-08-04 DIAGNOSIS — G25.81 RLS (RESTLESS LEGS SYNDROME): ICD-10-CM

## 2024-08-05 RX ORDER — LORAZEPAM 2 MG/1
2 TABLET ORAL NIGHTLY PRN
Qty: 30 TABLET | Refills: 1 | Status: SHIPPED | OUTPATIENT
Start: 2024-08-05 | End: 2024-10-04

## 2024-08-05 RX ORDER — LORAZEPAM 2 MG/1
2 TABLET ORAL
COMMUNITY
Start: 2024-06-10 | End: 2024-08-05 | Stop reason: SDUPTHER

## 2024-08-05 RX ORDER — LORAZEPAM 2 MG/1
2 TABLET ORAL DAILY PRN
Qty: 30 TABLET | Refills: 2 | OUTPATIENT
Start: 2024-08-05 | End: 2024-11-03

## 2024-08-05 NOTE — TELEPHONE ENCOUNTER
PCP: Martin Morales MD    Last appt: 7/3/2023   No future appointments.    Requested Prescriptions     Pending Prescriptions Disp Refills    LORazepam (ATIVAN) 2 MG tablet [Pharmacy Med Name: LORAZEPAM 2 MG TABLET] 30 tablet 2     Sig: Take 1 tablet by mouth daily as needed for Anxiety for up to 90 days. Max Daily Amount: 2 mg     Rx in pending for provider review and approval.    Carissa \"Roach\" PIERRE Jordan

## 2024-08-05 NOTE — TELEPHONE ENCOUNTER
From: Virgil Clarke  To: Dr. Martin Morales  Sent: 8/4/2024 7:34 PM EDT  Subject: Lorazepam refill    Can i get a refill on Lorazepam 2 MG . I take it to sleep and I take half doses each night. Need filled at [facility] . I'm completely out. If you can fill immediately that would be appreciated.         Thank you,     Virgil Clarke  [phone]

## 2024-08-19 DIAGNOSIS — F41.8 OTHER SPECIFIED ANXIETY DISORDERS: ICD-10-CM

## 2024-08-20 RX ORDER — SERTRALINE HYDROCHLORIDE 100 MG/1
100 TABLET, FILM COATED ORAL DAILY
Qty: 90 TABLET | Refills: 0 | Status: SHIPPED | OUTPATIENT
Start: 2024-08-20

## 2024-09-24 ENCOUNTER — TELEPHONE (OUTPATIENT)
Age: 49
End: 2024-09-24

## 2024-10-01 DIAGNOSIS — Z51.81 MEDICATION MONITORING ENCOUNTER: ICD-10-CM

## 2024-10-01 DIAGNOSIS — F41.8 DEPRESSION WITH ANXIETY: ICD-10-CM

## 2024-10-01 DIAGNOSIS — E29.1 MALE HYPOGONADISM: ICD-10-CM

## 2024-10-01 DIAGNOSIS — E78.2 MIXED HYPERLIPIDEMIA: ICD-10-CM

## 2024-10-01 DIAGNOSIS — Z00.00 PREVENTATIVE HEALTH CARE: Primary | ICD-10-CM

## 2024-11-06 ENCOUNTER — OFFICE VISIT (OUTPATIENT)
Age: 49
End: 2024-11-06

## 2024-11-06 VITALS
TEMPERATURE: 97.6 F | RESPIRATION RATE: 19 BRPM | OXYGEN SATURATION: 94 % | DIASTOLIC BLOOD PRESSURE: 80 MMHG | SYSTOLIC BLOOD PRESSURE: 121 MMHG | HEIGHT: 72 IN | BODY MASS INDEX: 29.12 KG/M2 | HEART RATE: 75 BPM | WEIGHT: 215 LBS

## 2024-11-06 DIAGNOSIS — R41.840 DECREASED ATTENTION SPAN: ICD-10-CM

## 2024-11-06 DIAGNOSIS — F41.8 DEPRESSION WITH ANXIETY: ICD-10-CM

## 2024-11-06 DIAGNOSIS — F51.01 PRIMARY INSOMNIA: ICD-10-CM

## 2024-11-06 DIAGNOSIS — E78.00 PURE HYPERCHOLESTEROLEMIA: ICD-10-CM

## 2024-11-06 DIAGNOSIS — Z00.00 PREVENTATIVE HEALTH CARE: Primary | ICD-10-CM

## 2024-11-06 DIAGNOSIS — Z12.11 SCREEN FOR COLON CANCER: ICD-10-CM

## 2024-11-06 DIAGNOSIS — G25.81 RESTLESS LEGS SYNDROME (RLS): ICD-10-CM

## 2024-11-06 DIAGNOSIS — E29.1 MALE HYPOGONADISM: ICD-10-CM

## 2024-11-06 DIAGNOSIS — Z00.00 PREVENTATIVE HEALTH CARE: ICD-10-CM

## 2024-11-06 DIAGNOSIS — Z51.81 MEDICATION MONITORING ENCOUNTER: ICD-10-CM

## 2024-11-06 LAB
ALBUMIN SERPL-MCNC: 4 G/DL (ref 3.5–5)
ALBUMIN/GLOB SERPL: 1.2 (ref 1.1–2.2)
ALP SERPL-CCNC: 85 U/L (ref 45–117)
ALT SERPL-CCNC: 20 U/L (ref 12–78)
ANION GAP SERPL CALC-SCNC: 3 MMOL/L (ref 2–12)
APPEARANCE UR: ABNORMAL
AST SERPL-CCNC: 21 U/L (ref 15–37)
BACTERIA URNS QL MICRO: NEGATIVE /HPF
BILIRUB SERPL-MCNC: 0.4 MG/DL (ref 0.2–1)
BILIRUB UR QL: NEGATIVE
BUN SERPL-MCNC: 14 MG/DL (ref 6–20)
BUN/CREAT SERPL: 15 (ref 12–20)
CALCIUM SERPL-MCNC: 9.4 MG/DL (ref 8.5–10.1)
CHLORIDE SERPL-SCNC: 106 MMOL/L (ref 97–108)
CHOLEST SERPL-MCNC: 244 MG/DL
CO2 SERPL-SCNC: 29 MMOL/L (ref 21–32)
COLOR UR: ABNORMAL
CREAT SERPL-MCNC: 0.92 MG/DL (ref 0.7–1.3)
EPITH CASTS URNS QL MICRO: ABNORMAL /LPF
ERYTHROCYTE [DISTWIDTH] IN BLOOD BY AUTOMATED COUNT: 13.4 % (ref 11.5–14.5)
GLOBULIN SER CALC-MCNC: 3.3 G/DL (ref 2–4)
GLUCOSE SERPL-MCNC: 108 MG/DL (ref 65–100)
GLUCOSE UR STRIP.AUTO-MCNC: NEGATIVE MG/DL
HCT VFR BLD AUTO: 44.1 % (ref 36.6–50.3)
HDLC SERPL-MCNC: 53 MG/DL
HDLC SERPL: 4.6 (ref 0–5)
HGB BLD-MCNC: 14.6 G/DL (ref 12.1–17)
HGB UR QL STRIP: NEGATIVE
HYALINE CASTS URNS QL MICRO: ABNORMAL /LPF (ref 0–5)
KETONES UR QL STRIP.AUTO: NEGATIVE MG/DL
LDLC SERPL CALC-MCNC: 166.6 MG/DL (ref 0–100)
LEUKOCYTE ESTERASE UR QL STRIP.AUTO: NEGATIVE
MCH RBC QN AUTO: 28.9 PG (ref 26–34)
MCHC RBC AUTO-ENTMCNC: 33.1 G/DL (ref 30–36.5)
MCV RBC AUTO: 87.2 FL (ref 80–99)
NITRITE UR QL STRIP.AUTO: NEGATIVE
NRBC # BLD: 0 K/UL (ref 0–0.01)
NRBC BLD-RTO: 0 PER 100 WBC
PH UR STRIP: 5.5 (ref 5–8)
PLATELET # BLD AUTO: 366 K/UL (ref 150–400)
PMV BLD AUTO: 9.9 FL (ref 8.9–12.9)
POTASSIUM SERPL-SCNC: 4.4 MMOL/L (ref 3.5–5.1)
PROT SERPL-MCNC: 7.3 G/DL (ref 6.4–8.2)
PROT UR STRIP-MCNC: NEGATIVE MG/DL
PSA SERPL-MCNC: 0.4 NG/ML (ref 0.01–4)
RBC # BLD AUTO: 5.06 M/UL (ref 4.1–5.7)
RBC #/AREA URNS HPF: ABNORMAL /HPF (ref 0–5)
SODIUM SERPL-SCNC: 138 MMOL/L (ref 136–145)
SP GR UR REFRACTOMETRY: 1.03 (ref 1–1.03)
TRIGL SERPL-MCNC: 122 MG/DL
UROBILINOGEN UR QL STRIP.AUTO: 0.2 EU/DL (ref 0.2–1)
VLDLC SERPL CALC-MCNC: 24.4 MG/DL
WBC # BLD AUTO: 8.8 K/UL (ref 4.1–11.1)
WBC URNS QL MICRO: ABNORMAL /HPF (ref 0–4)

## 2024-11-06 RX ORDER — LORAZEPAM 1 MG/1
1 TABLET ORAL NIGHTLY PRN
Qty: 30 TABLET | Refills: 1 | Status: SHIPPED | OUTPATIENT
Start: 2024-11-06 | End: 2025-01-05

## 2024-11-06 RX ORDER — ZOSTER VACCINE RECOMBINANT, ADJUVANTED 50 MCG/0.5
0.5 KIT INTRAMUSCULAR SEE ADMIN INSTRUCTIONS
Qty: 0.5 ML | Refills: 0 | Status: SHIPPED | OUTPATIENT
Start: 2024-11-06 | End: 2025-05-05

## 2024-11-06 SDOH — ECONOMIC STABILITY: FOOD INSECURITY: WITHIN THE PAST 12 MONTHS, YOU WORRIED THAT YOUR FOOD WOULD RUN OUT BEFORE YOU GOT MONEY TO BUY MORE.: NEVER TRUE

## 2024-11-06 SDOH — ECONOMIC STABILITY: FOOD INSECURITY: WITHIN THE PAST 12 MONTHS, THE FOOD YOU BOUGHT JUST DIDN'T LAST AND YOU DIDN'T HAVE MONEY TO GET MORE.: NEVER TRUE

## 2024-11-06 SDOH — ECONOMIC STABILITY: INCOME INSECURITY: HOW HARD IS IT FOR YOU TO PAY FOR THE VERY BASICS LIKE FOOD, HOUSING, MEDICAL CARE, AND HEATING?: NOT HARD AT ALL

## 2024-11-06 ASSESSMENT — PATIENT HEALTH QUESTIONNAIRE - PHQ9
7. TROUBLE CONCENTRATING ON THINGS, SUCH AS READING THE NEWSPAPER OR WATCHING TELEVISION: NOT AT ALL
3. TROUBLE FALLING OR STAYING ASLEEP: NOT AT ALL
4. FEELING TIRED OR HAVING LITTLE ENERGY: NOT AT ALL
10. IF YOU CHECKED OFF ANY PROBLEMS, HOW DIFFICULT HAVE THESE PROBLEMS MADE IT FOR YOU TO DO YOUR WORK, TAKE CARE OF THINGS AT HOME, OR GET ALONG WITH OTHER PEOPLE: NOT DIFFICULT AT ALL
6. FEELING BAD ABOUT YOURSELF - OR THAT YOU ARE A FAILURE OR HAVE LET YOURSELF OR YOUR FAMILY DOWN: NOT AT ALL
8. MOVING OR SPEAKING SO SLOWLY THAT OTHER PEOPLE COULD HAVE NOTICED. OR THE OPPOSITE, BEING SO FIGETY OR RESTLESS THAT YOU HAVE BEEN MOVING AROUND A LOT MORE THAN USUAL: NOT AT ALL
SUM OF ALL RESPONSES TO PHQ QUESTIONS 1-9: 0
SUM OF ALL RESPONSES TO PHQ9 QUESTIONS 1 & 2: 0
5. POOR APPETITE OR OVEREATING: NOT AT ALL
SUM OF ALL RESPONSES TO PHQ QUESTIONS 1-9: 0
1. LITTLE INTEREST OR PLEASURE IN DOING THINGS: NOT AT ALL
2. FEELING DOWN, DEPRESSED OR HOPELESS: NOT AT ALL
SUM OF ALL RESPONSES TO PHQ QUESTIONS 1-9: 0
SUM OF ALL RESPONSES TO PHQ QUESTIONS 1-9: 0
9. THOUGHTS THAT YOU WOULD BE BETTER OFF DEAD, OR OF HURTING YOURSELF: NOT AT ALL

## 2024-11-06 NOTE — PROGRESS NOTES
\"Have you been to the ER, urgent care clinic since your last visit?  Hospitalized since your last visit?\"    NO    “Have you seen or consulted any other health care providers outside our system since your last visit?”    NO      “Have you had a colorectal cancer screening such as a colonoscopy/FIT/Cologuard?    NO    No colonoscopy on file  No cologuard on file  No FIT/FOBT on file   No flexible sigmoidoscopy on file

## 2024-11-06 NOTE — PROGRESS NOTES
Virgil Clarke is a 49 y.o. 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  Feels he is doing very well, but he admits to having some degree of blunted emotion.  He states he has not cried  from loss of his dog,, but the dog was 17 years old and expected to pass away.  Denies depression, excessive alcohol use    Issues with focus and concentration.  Asks if he has ADD.  Has taken someone's adderall in the distant past.    It made him jittery.  Has difficulty multitasking at work.  Owns a storm water company.     Reports RLS s/s.  Both legs kick, arms are waving at night.    Sleeping fairly well, however, on lorazpem  Saw sleep specialist Dr. Carrero in the past..  Has not tried pramipexole yet, despite being prescribed over 6 months ago by me.    Hyperlipidemia  No new myalgias, no joint pains, no weakness  No TIA's, no chest pain on exertion, no dyspnea on exertion, no swelling of ankles.   Lab Results   Component Value Date    CHOL 233 (H) 07/03/2023    TRIG 393 (H) 07/03/2023    HDL 35 07/03/2023    .4 (H) 07/03/2023    VLDL 78.6 07/03/2023    CHOLHDLRATIO 6.7 (H) 07/03/2023           Lifestyle: generally follows a low fat low cholesterol diet    Health Maintenance   Topic Date Due    Hepatitis B vaccine (1 of 3 - 19+ 3-dose series) Never done    Colorectal Cancer Screen  Never done    Shingles vaccine (1 of 2) Never done    Depression Monitoring  07/03/2024    COVID-19 Vaccine (3 - 2023-24 season) 09/01/2024    Diabetes screen  07/03/2026    DTaP/Tdap/Td vaccine (2 - Td or Tdap) 11/02/2027    Lipids  07/03/2028    Hepatitis C screen  Completed    HIV screen  Completed    Hepatitis A vaccine  Aged Out    Hib vaccine  Aged Out    Polio vaccine  Aged Out    Meningococcal (ACWY) vaccine  Aged Out    Pneumococcal 0-64 years Vaccine

## 2024-11-07 LAB
AMPHETAMINES UR QL SCN: NEGATIVE NG/ML
BARBITURATES UR QL SCN: NEGATIVE NG/ML
BENZODIAZ UR QL SCN: NEGATIVE NG/ML
BZE UR QL SCN: NEGATIVE NG/ML
CANNABINOIDS UR QL SCN: POSITIVE NG/ML
CREAT UR-MCNC: 289.9 MG/DL (ref 20–300)
LABORATORY COMMENT REPORT: ABNORMAL
METHADONE UR QL SCN: NEGATIVE NG/ML
OPIATES UR QL SCN: NEGATIVE NG/ML
OXYCODONE+OXYMORPHONE UR QL SCN: NEGATIVE NG/ML
PCP UR QL: NEGATIVE NG/ML
PH UR: 5.2 (ref 4.5–8.9)
PROPOXYPH UR QL SCN: NEGATIVE NG/ML

## 2024-11-17 LAB — TESTOST SERPL-MCNC: 244 NG/DL

## 2024-11-18 RX ORDER — ATORVASTATIN CALCIUM 10 MG/1
10 TABLET, FILM COATED ORAL DAILY
Qty: 90 TABLET | Refills: 1 | Status: SHIPPED | OUTPATIENT
Start: 2024-11-18

## 2024-11-19 LAB
TESTOST FREE MFR SERPL: 1.8 %
TESTOST FREE SERPL-MCNC: 44 PG/ML
TESTOST SERPL-MCNC: 244 NG/DL

## 2025-01-30 DIAGNOSIS — F41.8 DEPRESSION WITH ANXIETY: ICD-10-CM

## 2025-01-30 DIAGNOSIS — E29.1 MALE HYPOGONADISM: Primary | ICD-10-CM

## 2025-01-30 DIAGNOSIS — G25.81 RLS (RESTLESS LEGS SYNDROME): ICD-10-CM

## 2025-01-30 RX ORDER — PRAMIPEXOLE DIHYDROCHLORIDE 0.12 MG/1
0.12 TABLET ORAL NIGHTLY
Qty: 90 TABLET | Refills: 1 | Status: SHIPPED | OUTPATIENT
Start: 2025-01-30

## 2025-01-30 RX ORDER — CLOMIPHENE CITRATE 50 MG/1
25 TABLET ORAL DAILY
Qty: 15 TABLET | Refills: 3 | Status: SHIPPED | OUTPATIENT
Start: 2025-01-30

## 2025-01-30 RX ORDER — LORAZEPAM 1 MG/1
1 TABLET ORAL 2 TIMES DAILY PRN
Qty: 60 TABLET | Refills: 0 | Status: SHIPPED | OUTPATIENT
Start: 2025-01-30 | End: 2025-03-01

## 2025-02-18 DIAGNOSIS — F41.8 OTHER SPECIFIED ANXIETY DISORDERS: ICD-10-CM

## 2025-02-18 RX ORDER — SERTRALINE HYDROCHLORIDE 100 MG/1
100 TABLET, FILM COATED ORAL DAILY
Qty: 90 TABLET | Refills: 0 | Status: SHIPPED | OUTPATIENT
Start: 2025-02-18

## 2025-04-11 ENCOUNTER — PATIENT MESSAGE (OUTPATIENT)
Facility: CLINIC | Age: 50
End: 2025-04-11

## 2025-04-11 DIAGNOSIS — E29.1 MALE HYPOGONADISM: ICD-10-CM

## 2025-04-11 DIAGNOSIS — F51.01 PRIMARY INSOMNIA: Primary | ICD-10-CM

## 2025-04-11 DIAGNOSIS — F41.8 DEPRESSION WITH ANXIETY: ICD-10-CM

## 2025-04-11 RX ORDER — CLOMIPHENE CITRATE 50 MG/1
25 TABLET ORAL DAILY
Qty: 15 TABLET | Refills: 3 | Status: SHIPPED | OUTPATIENT
Start: 2025-04-11

## 2025-04-11 RX ORDER — LORAZEPAM 1 MG/1
1 TABLET ORAL 2 TIMES DAILY PRN
Qty: 60 TABLET | Refills: 0 | Status: SHIPPED | OUTPATIENT
Start: 2025-04-11 | End: 2025-05-11

## 2025-05-20 NOTE — ADDENDUM NOTE
Addended by: Sharon Harrison on: 10/10/2017 10:51 AM     Modules accepted: Sathya, Neri Quality 431: Preventive Care And Screening: Unhealthy Alcohol Use - Screening: Patient not identified as an unhealthy alcohol user when screened for unhealthy alcohol use using a systematic screening method Quality 226: Preventive Care And Screening: Tobacco Use: Screening And Cessation Intervention: Patient screened for tobacco use and is an ex/non-smoker Detail Level: Detailed No

## 2025-05-23 DIAGNOSIS — F41.8 OTHER SPECIFIED ANXIETY DISORDERS: ICD-10-CM

## 2025-05-23 DIAGNOSIS — E78.5 HYPERLIPIDEMIA, UNSPECIFIED HYPERLIPIDEMIA TYPE: Primary | ICD-10-CM

## 2025-05-23 RX ORDER — SERTRALINE HYDROCHLORIDE 100 MG/1
100 TABLET, FILM COATED ORAL DAILY
Qty: 90 TABLET | Refills: 0 | Status: SHIPPED | OUTPATIENT
Start: 2025-05-23

## 2025-05-23 RX ORDER — ATORVASTATIN CALCIUM 10 MG/1
10 TABLET, FILM COATED ORAL DAILY
Qty: 90 TABLET | Refills: 1 | Status: SHIPPED | OUTPATIENT
Start: 2025-05-23

## 2025-06-12 ENCOUNTER — PATIENT MESSAGE (OUTPATIENT)
Facility: CLINIC | Age: 50
End: 2025-06-12

## 2025-06-12 DIAGNOSIS — F41.8 DEPRESSION WITH ANXIETY: Primary | ICD-10-CM

## 2025-06-12 DIAGNOSIS — F41.8 OTHER SPECIFIED ANXIETY DISORDERS: ICD-10-CM

## 2025-06-12 RX ORDER — LORAZEPAM 1 MG/1
1 TABLET ORAL 2 TIMES DAILY PRN
Qty: 60 TABLET | Refills: 0 | Status: SHIPPED | OUTPATIENT
Start: 2025-06-12 | End: 2025-07-12

## 2025-08-06 ENCOUNTER — OFFICE VISIT (OUTPATIENT)
Facility: CLINIC | Age: 50
End: 2025-08-06
Payer: COMMERCIAL

## 2025-08-06 VITALS
OXYGEN SATURATION: 96 % | HEIGHT: 72 IN | TEMPERATURE: 98.2 F | WEIGHT: 221.4 LBS | DIASTOLIC BLOOD PRESSURE: 84 MMHG | SYSTOLIC BLOOD PRESSURE: 132 MMHG | RESPIRATION RATE: 16 BRPM | HEART RATE: 85 BPM | BODY MASS INDEX: 29.99 KG/M2

## 2025-08-06 DIAGNOSIS — E78.00 PURE HYPERCHOLESTEROLEMIA: ICD-10-CM

## 2025-08-06 DIAGNOSIS — F41.8 DEPRESSION WITH ANXIETY: ICD-10-CM

## 2025-08-06 DIAGNOSIS — Z12.5 SCREENING FOR PROSTATE CANCER: ICD-10-CM

## 2025-08-06 DIAGNOSIS — H69.93 DYSFUNCTION OF BOTH EUSTACHIAN TUBES: ICD-10-CM

## 2025-08-06 DIAGNOSIS — E29.1 MALE HYPOGONADISM: Primary | ICD-10-CM

## 2025-08-06 DIAGNOSIS — C44.319 BASAL CELL CARCINOMA (BCC) OF LEFT CHEEK: ICD-10-CM

## 2025-08-06 DIAGNOSIS — R07.89 OTHER CHEST PAIN: ICD-10-CM

## 2025-08-06 DIAGNOSIS — G56.03 CARPAL TUNNEL SYNDROME, BILATERAL: ICD-10-CM

## 2025-08-06 DIAGNOSIS — H81.09 MENIERE'S DISEASE, UNSPECIFIED LATERALITY: ICD-10-CM

## 2025-08-06 PROCEDURE — 99214 OFFICE O/P EST MOD 30 MIN: CPT | Performed by: INTERNAL MEDICINE

## 2025-08-06 RX ORDER — LORAZEPAM 0.5 MG/1
0.5 TABLET ORAL 2 TIMES DAILY PRN
Qty: 60 TABLET | Refills: 1 | Status: SHIPPED | OUTPATIENT
Start: 2025-08-06 | End: 2025-10-05

## 2025-08-06 RX ORDER — CLOMIPHENE CITRATE 50 MG/1
25 TABLET ORAL DAILY
Qty: 15 TABLET | Refills: 5 | Status: SHIPPED | OUTPATIENT
Start: 2025-08-06

## 2025-08-06 RX ORDER — FLUTICASONE PROPIONATE 50 MCG
1 SPRAY, SUSPENSION (ML) NASAL DAILY
Qty: 32 G | Refills: 1 | Status: SHIPPED | OUTPATIENT
Start: 2025-08-06

## 2025-08-06 SDOH — ECONOMIC STABILITY: FOOD INSECURITY: WITHIN THE PAST 12 MONTHS, YOU WORRIED THAT YOUR FOOD WOULD RUN OUT BEFORE YOU GOT MONEY TO BUY MORE.: NEVER TRUE

## 2025-08-06 SDOH — ECONOMIC STABILITY: FOOD INSECURITY: WITHIN THE PAST 12 MONTHS, THE FOOD YOU BOUGHT JUST DIDN'T LAST AND YOU DIDN'T HAVE MONEY TO GET MORE.: NEVER TRUE

## 2025-08-06 ASSESSMENT — PATIENT HEALTH QUESTIONNAIRE - PHQ9
SUM OF ALL RESPONSES TO PHQ QUESTIONS 1-9: 0
SUM OF ALL RESPONSES TO PHQ QUESTIONS 1-9: 0
3. TROUBLE FALLING OR STAYING ASLEEP: NOT AT ALL
1. LITTLE INTEREST OR PLEASURE IN DOING THINGS: NOT AT ALL
SUM OF ALL RESPONSES TO PHQ QUESTIONS 1-9: 0
8. MOVING OR SPEAKING SO SLOWLY THAT OTHER PEOPLE COULD HAVE NOTICED. OR THE OPPOSITE, BEING SO FIGETY OR RESTLESS THAT YOU HAVE BEEN MOVING AROUND A LOT MORE THAN USUAL: NOT AT ALL
5. POOR APPETITE OR OVEREATING: NOT AT ALL
10. IF YOU CHECKED OFF ANY PROBLEMS, HOW DIFFICULT HAVE THESE PROBLEMS MADE IT FOR YOU TO DO YOUR WORK, TAKE CARE OF THINGS AT HOME, OR GET ALONG WITH OTHER PEOPLE: NOT DIFFICULT AT ALL
SUM OF ALL RESPONSES TO PHQ QUESTIONS 1-9: 0
9. THOUGHTS THAT YOU WOULD BE BETTER OFF DEAD, OR OF HURTING YOURSELF: NOT AT ALL
7. TROUBLE CONCENTRATING ON THINGS, SUCH AS READING THE NEWSPAPER OR WATCHING TELEVISION: NOT AT ALL
2. FEELING DOWN, DEPRESSED OR HOPELESS: NOT AT ALL
6. FEELING BAD ABOUT YOURSELF - OR THAT YOU ARE A FAILURE OR HAVE LET YOURSELF OR YOUR FAMILY DOWN: NOT AT ALL
4. FEELING TIRED OR HAVING LITTLE ENERGY: NOT AT ALL

## 2025-08-12 ENCOUNTER — PATIENT MESSAGE (OUTPATIENT)
Facility: CLINIC | Age: 50
End: 2025-08-12

## 2025-08-13 DIAGNOSIS — H81.09 MENIERE'S DISEASE, UNSPECIFIED LATERALITY: Primary | ICD-10-CM

## 2025-08-13 DIAGNOSIS — R42 VERTIGO: ICD-10-CM

## 2025-08-13 DIAGNOSIS — H69.93 DYSFUNCTION OF BOTH EUSTACHIAN TUBES: ICD-10-CM

## 2025-08-25 DIAGNOSIS — Z12.5 SCREENING FOR PROSTATE CANCER: ICD-10-CM

## 2025-08-25 DIAGNOSIS — H81.09 MENIERE'S DISEASE, UNSPECIFIED LATERALITY: ICD-10-CM

## 2025-08-25 DIAGNOSIS — E78.00 PURE HYPERCHOLESTEROLEMIA: ICD-10-CM

## 2025-08-25 DIAGNOSIS — E29.1 MALE HYPOGONADISM: ICD-10-CM

## 2025-08-25 LAB
ALBUMIN SERPL-MCNC: 3.8 G/DL (ref 3.5–5.2)
ALBUMIN/GLOB SERPL: 1.3 (ref 1.1–2.2)
ALP SERPL-CCNC: 57 U/L (ref 40–129)
ALT SERPL-CCNC: 12 U/L (ref 10–50)
ANION GAP SERPL CALC-SCNC: 10 MMOL/L (ref 2–14)
AST SERPL-CCNC: 17 U/L (ref 10–50)
BILIRUB SERPL-MCNC: 0.3 MG/DL (ref 0–1.2)
BUN SERPL-MCNC: 11 MG/DL (ref 6–20)
BUN/CREAT SERPL: 13 (ref 12–20)
CALCIUM SERPL-MCNC: 9.2 MG/DL (ref 8.6–10)
CHLORIDE SERPL-SCNC: 105 MMOL/L (ref 98–107)
CHOLEST SERPL-MCNC: 207 MG/DL (ref 0–200)
CO2 SERPL-SCNC: 24 MMOL/L (ref 20–29)
CREAT SERPL-MCNC: 0.84 MG/DL (ref 0.7–1.2)
ERYTHROCYTE [DISTWIDTH] IN BLOOD BY AUTOMATED COUNT: 13.3 % (ref 11.5–14.5)
GLOBULIN SER CALC-MCNC: 2.8 G/DL (ref 2–4)
GLUCOSE SERPL-MCNC: 99 MG/DL (ref 65–100)
HCT VFR BLD AUTO: 42.5 % (ref 36.6–50.3)
HDLC SERPL-MCNC: 42 MG/DL (ref 40–60)
HDLC SERPL: 4.9 (ref 0–5)
HGB BLD-MCNC: 14 G/DL (ref 12.1–17)
LDLC SERPL CALC-MCNC: 133 MG/DL
MCH RBC QN AUTO: 28.9 PG (ref 26–34)
MCHC RBC AUTO-ENTMCNC: 32.9 G/DL (ref 30–36.5)
MCV RBC AUTO: 87.8 FL (ref 80–99)
NRBC # BLD: 0 K/UL (ref 0–0.01)
NRBC BLD-RTO: 0 PER 100 WBC
PLATELET # BLD AUTO: 304 K/UL (ref 150–400)
PMV BLD AUTO: 10.3 FL (ref 8.9–12.9)
POTASSIUM SERPL-SCNC: 4.4 MMOL/L (ref 3.5–5.1)
PROT SERPL-MCNC: 6.6 G/DL (ref 6.4–8.3)
PSA SERPL-MCNC: 0.4 NG/ML (ref 0–3.1)
RBC # BLD AUTO: 4.84 M/UL (ref 4.1–5.7)
SODIUM SERPL-SCNC: 139 MMOL/L (ref 136–145)
TRIGL SERPL-MCNC: 159 MG/DL (ref 0–150)
VLDLC SERPL CALC-MCNC: 32 MG/DL
WBC # BLD AUTO: 7.7 K/UL (ref 4.1–11.1)

## 2025-08-29 LAB
TESTOST FREE SERPL-MCNC: 20.1 PG/ML (ref 7.2–24)
TESTOST SERPL-MCNC: 592 NG/DL (ref 264–916)

## 2025-08-30 DIAGNOSIS — G25.81 RLS (RESTLESS LEGS SYNDROME): ICD-10-CM

## 2025-08-30 DIAGNOSIS — F41.8 OTHER SPECIFIED ANXIETY DISORDERS: ICD-10-CM

## 2025-08-31 RX ORDER — SERTRALINE HYDROCHLORIDE 100 MG/1
100 TABLET, FILM COATED ORAL DAILY
Qty: 90 TABLET | Refills: 1 | Status: SHIPPED | OUTPATIENT
Start: 2025-08-31

## 2025-08-31 RX ORDER — PRAMIPEXOLE DIHYDROCHLORIDE 0.12 MG/1
0.12 TABLET ORAL NIGHTLY
Qty: 90 TABLET | Refills: 1 | Status: SHIPPED | OUTPATIENT
Start: 2025-08-31